# Patient Record
Sex: MALE | Race: WHITE | Employment: OTHER | URBAN - METROPOLITAN AREA
[De-identification: names, ages, dates, MRNs, and addresses within clinical notes are randomized per-mention and may not be internally consistent; named-entity substitution may affect disease eponyms.]

---

## 2017-05-24 ENCOUNTER — OFFICE VISIT (OUTPATIENT)
Dept: ORTHOPEDIC SURGERY | Age: 57
End: 2017-05-24

## 2017-05-24 VITALS
HEART RATE: 70 BPM | BODY MASS INDEX: 34.61 KG/M2 | TEMPERATURE: 98 F | WEIGHT: 221 LBS | DIASTOLIC BLOOD PRESSURE: 81 MMHG | SYSTOLIC BLOOD PRESSURE: 125 MMHG

## 2017-05-24 DIAGNOSIS — M17.11 PRIMARY OSTEOARTHRITIS OF RIGHT KNEE: Primary | ICD-10-CM

## 2017-05-24 DIAGNOSIS — M25.561 ACUTE PAIN OF RIGHT KNEE: ICD-10-CM

## 2017-05-24 RX ORDER — MELOXICAM 15 MG/1
15 TABLET ORAL
Qty: 30 TAB | Refills: 0 | Status: SHIPPED | OUTPATIENT
Start: 2017-05-24 | End: 2017-06-21 | Stop reason: SDUPTHER

## 2017-05-24 NOTE — PROCEDURES
X RAYS AT [address] [place] [address]  5/24/2017    Right KNEE:     Bones: No fractures, subluxations, or dislocations  Alignment: mild Varus Knee alignment  Joint: Medial joint with moderate OA changes    Soft Tissues: Normal   , mild swelling  Mineralization: suggests  no Osteopenia    I have personally reviewed the results of the above study. The interpretation of this study is my professional opinion.

## 2017-05-24 NOTE — MR AVS SNAPSHOT
Visit Information Date & Time Provider Department Dept. Phone Encounter #  
 5/24/2017  2:00 PM Eunice Martin MD [facility] [phone] [id number] Upcoming Health Maintenance Date Due Hepatitis C Screening 1960 DTaP/Tdap/Td series (1 - Tdap) 5/12/1983 FOBT Q 1 YEAR AGE 50-75 5/12/2012 INFLUENZA AGE 9 TO ADULT 8/1/2017 Allergies as of 5/24/2017  Review Complete On: 5/24/2017 By: Natali Villanueva No Known Allergies Current Immunizations  Never Reviewed No immunizations on file. Not reviewed this visit You Were Diagnosed With   
  
 Codes Comments Primary osteoarthritis of right knee    -  Primary ICD-10-CM: M17.11 ICD-9-CM: 715.16 Acute pain of right knee     ICD-10-CM: M25.561 ICD-9-CM: 719.46 Vitals BP Pulse Temp Weight(growth percentile) BMI Smoking Status 125/81 (BP 1 Location: Left arm, BP Patient Position: Sitting) 70 98 °F (36.7 °C) (Oral) 221 lb (100.2 kg) 34.61 kg/m2 Never Smoker BMI and BSA Data Body Mass Index Body Surface Area  
 34.61 kg/m 2 2.18 m 2 Preferred Pharmacy Pharmacy Name Phone [facility] [address], [facility] [phone] Your Updated Medication List  
  
   
This list is accurate as of: 5/24/17  3:58 PM.  Always use your most recent med list.  
  
  
  
  
 ALEVE 220 mg tablet Generic drug:  naproxen sodium Take 220 mg by mouth two (2) times daily (with meals). LIPITOR 40 mg tablet Generic drug:  atorvastatin Take  by mouth daily. meloxicam 15 mg tablet Commonly known as:  MOBIC Take 1 Tab by mouth daily (with breakfast). MOTRIN 800 mg tablet Generic drug:  ibuprofen Take  by mouth. tamsulosin 0.4 mg capsule Commonly known as:  FLOMAX Take 1 Cap by mouth daily (after dinner). Prescriptions Sent to Pharmacy Refills meloxicam (MOBIC) 15 mg tablet 0 Sig: Take 1 Tab by mouth daily (with breakfast). Class: Normal  
 Pharmacy: [facility], [address] [place] [address] #: [phone] Route: Oral  
  
We Performed the Following AMB POC XRAY, KNEE; COMPLETE, 4+ VIEW [37805 CPT(R)] To-Do List   
 05/24/2017 Imaging:  MRI KNEE RT WO CONT Referral Information Referral ID Referred By Referred To  
  
 [id number] Carlos Suzan Not Available Visits Status Start Date End Date 1 New Request 5/24/17 5/24/18 If your referral has a status of pending review or denied, additional information will be sent to support the outcome of this decision. Patient Instructions Please follow up after MRI. You are advised to contact us if your condition worsens. A MRI has been ordered for you. A [employer] will be contacting you to schedule the appointment as soon as it has been approved with your insurance company. Please schedule an appointment to follow up with the doctor or the physicians assistant after the MRI has been conducted. Knee Arthritis: Care Instructions Your Care Instructions Knee arthritis is a breakdown of the cartilage that cushions your knee joint. When the cartilage wears down, your bones rub against each other. This causes pain and stiffness. Knee arthritis tends to get worse with time. Treatment for knee arthritis involves reducing pain, making the leg muscles stronger, and staying at a healthy body weight. The treatment usually does not improve the health of the cartilage, but it can reduce pain and improve how well your knee works. You can take simple measures to protect your knee joints, ease your pain, and help you stay active. Follow-up care is a key part of your treatment and safety.  Be sure to make and go to all appointments, and call your doctor if you are having problems. It's also a good idea to know your test results and keep a list of the medicines you take. How can you care for yourself at home? · Know that knee arthritis will cause more pain on some days than on others. · Stay at a healthy weight. Lose weight if you are overweight. When you stand up, the pressure on your knees from every pound of body weight is multiplied four times. So if you lose 10 pounds, you will reduce the pressure on your knees by 40 pounds. · Talk to your doctor or physical therapist about exercises that will help ease joint pain. ¨ Stretch to help prevent stiffness and to prevent injury before you exercise. You may enjoy gentle forms of yoga to help keep your knee joints and muscles flexible. ¨ Walk instead of jog. ¨ Ride a bike. This makes your thigh muscles stronger and takes pressure off your knee. ¨ Wear well-fitting and comfortable shoes. ¨ Exercise in chest-deep water. This can help you exercise longer with less pain. ¨ Avoid exercises that include squatting or kneeling. They can put a lot of strain on your knees. ¨ Talk to your doctor to make sure that the exercise you do is not making the arthritis worse. · Do not sit for long periods of time. Try to walk once in a while to keep your knee from getting stiff. · Ask your doctor or physical therapist whether shoe inserts may reduce your arthritis pain. · If you can afford it, get new athletic shoes at least every year. This can help reduce the strain on your knees. · Use a device to help you do everyday activities. ¨ A cane or walking stick can help you keep your balance when you walk. Hold the cane or walking stick in the hand opposite the painful knee. ¨ If you feel like you may fall when you walk, try using crutches or a front-wheeled walker. These can prevent falls that could cause more damage to your knee. ¨ A knee brace may help keep your knee stable and prevent pain. ¨ You also can use other things to make life easier, such as a higher toilet seat and handrails in the bathtub or shower. · Take pain medicines exactly as directed. ¨ Do not wait until you are in severe pain. You will get better results if you take it sooner. ¨ If you are not taking a prescription pain medicine, take an over-the-counter medicine such as acetaminophen (Tylenol), ibuprofen (Advil, Motrin), or naproxen (Aleve). Read and follow all instructions on the label. ¨ Do not take two or more pain medicines at the same time unless the doctor told you to. Many pain medicines have acetaminophen, which is Tylenol. Too much acetaminophen (Tylenol) can be harmful. ¨ Tell your doctor if you take a blood thinner, have diabetes, or have allergies to shellfish. · Ask your doctor if you might benefit from a shot of steroid medicine into your knee. This may provide pain relief for several months. · Many people take the supplements glucosamine and chondroitin for osteoarthritis. Some people feel they help, but the medical research does not show that they work. Talk to your doctor before you take these supplements. When should you call for help? Call your doctor now or seek immediate medical care if: 
· You have sudden swelling, warmth, or pain in your knee. · You have knee pain and a fever or rash. · You have such bad pain that you cannot use your knee. Watch closely for changes in your health, and be sure to contact your doctor if you have any problems. Where can you learn more? Go to http://candelario-irineo.info/. Enter Y532 in the search box to learn more about \"Knee Arthritis: Care Instructions. \" Current as of: October 31, 2016 Content Version: 11.2 © 2983-6692 [employer]. Care instructions adapted under license by [employer] (which disclaims liability or warranty for this information).  If you have questions about a medical condition or this instruction, always ask your healthcare professional. [place] [address] any warranty or liability for your use of this information. Introducing [facility] SERVICES! Emilee Almazan introduces [employer] patient portal. Now you can access parts of your medical record, email your doctor's office, and request medication refills online. 1. In your internet browser, go to [email or web address] 2. Click on the First Time User? Click Here link in the Sign In box. You will see the New Member Sign Up page. 3. Enter your [employer] Access Code exactly as it appears below. You will not need to use this code after youve completed the sign-up process. If you do not sign up before the expiration date, you must request a new code. · [employer] Access Code: [email or web address] Expires: 8/22/2017  3:58 PM 
 
4. Enter the last four digits of your Social Security Number (xxxx) and Date of Birth (mm/dd/yyyy) as indicated and click Submit. You will be taken to the next sign-up page. 5. Create a [employer] ID. This will be your [employer] login ID and cannot be changed, so think of one that is secure and easy to remember. 6. Create a [employer] password. You can change your password at any time. 7. Enter your Password Reset Question and Answer. This can be used at a later time if you forget your password. 8. Enter your e-mail address. You will receive e-mail notification when new information is available in [address] 9. Click Sign Up. You can now view and download portions of your medical record. 10. Click the Download Summary menu link to download a portable copy of your medical information. If you have questions, please visit the Frequently Asked Questions section of the [employer] website. Remember, [employer] is NOT to be used for urgent needs. For medical emergencies, dial 911. Now available from your iPhone and Android! Please provide this summary of care documentation to your next provider. Your primary care clinician is listed as Malachy Lard. If you have any questions after today's visit, please call [phone].

## 2017-05-24 NOTE — PATIENT INSTRUCTIONS
Please follow up after MRI. You are advised to contact us if your condition worsens. A MRI has been ordered for you. A [employer] will be contacting you to schedule the appointment as soon as it has been approved with your insurance company. Please schedule an appointment to follow up with the doctor or the physicians assistant after the MRI has been conducted. Knee Arthritis: Care Instructions  Your Care Instructions  Knee arthritis is a breakdown of the cartilage that cushions your knee joint. When the cartilage wears down, your bones rub against each other. This causes pain and stiffness. Knee arthritis tends to get worse with time. Treatment for knee arthritis involves reducing pain, making the leg muscles stronger, and staying at a healthy body weight. The treatment usually does not improve the health of the cartilage, but it can reduce pain and improve how well your knee works. You can take simple measures to protect your knee joints, ease your pain, and help you stay active. Follow-up care is a key part of your treatment and safety. Be sure to make and go to all appointments, and call your doctor if you are having problems. It's also a good idea to know your test results and keep a list of the medicines you take. How can you care for yourself at home? · Know that knee arthritis will cause more pain on some days than on others. · Stay at a healthy weight. Lose weight if you are overweight. When you stand up, the pressure on your knees from every pound of body weight is multiplied four times. So if you lose 10 pounds, you will reduce the pressure on your knees by 40 pounds. · Talk to your doctor or physical therapist about exercises that will help ease joint pain. ¨ Stretch to help prevent stiffness and to prevent injury before you exercise. You may enjoy gentle forms of yoga to help keep your knee joints and muscles flexible. ¨ Walk instead of jog. ¨ Ride a bike.  This makes your thigh muscles stronger and takes pressure off your knee. ¨ Wear well-fitting and comfortable shoes. ¨ Exercise in chest-deep water. This can help you exercise longer with less pain. ¨ Avoid exercises that include squatting or kneeling. They can put a lot of strain on your knees. ¨ Talk to your doctor to make sure that the exercise you do is not making the arthritis worse. · Do not sit for long periods of time. Try to walk once in a while to keep your knee from getting stiff. · Ask your doctor or physical therapist whether shoe inserts may reduce your arthritis pain. · If you can afford it, get new athletic shoes at least every year. This can help reduce the strain on your knees. · Use a device to help you do everyday activities. ¨ A cane or walking stick can help you keep your balance when you walk. Hold the cane or walking stick in the hand opposite the painful knee. ¨ If you feel like you may fall when you walk, try using crutches or a front-wheeled walker. These can prevent falls that could cause more damage to your knee. ¨ A knee brace may help keep your knee stable and prevent pain. ¨ You also can use other things to make life easier, such as a higher toilet seat and handrails in the bathtub or shower. · Take pain medicines exactly as directed. ¨ Do not wait until you are in severe pain. You will get better results if you take it sooner. ¨ If you are not taking a prescription pain medicine, take an over-the-counter medicine such as acetaminophen (Tylenol), ibuprofen (Advil, Motrin), or naproxen (Aleve). Read and follow all instructions on the label. ¨ Do not take two or more pain medicines at the same time unless the doctor told you to. Many pain medicines have acetaminophen, which is Tylenol. Too much acetaminophen (Tylenol) can be harmful. ¨ Tell your doctor if you take a blood thinner, have diabetes, or have allergies to shellfish.   · Ask your doctor if you might benefit from a shot of steroid medicine into your knee. This may provide pain relief for several months. · Many people take the supplements glucosamine and chondroitin for osteoarthritis. Some people feel they help, but the medical research does not show that they work. Talk to your doctor before you take these supplements. When should you call for help? Call your doctor now or seek immediate medical care if:  · You have sudden swelling, warmth, or pain in your knee. · You have knee pain and a fever or rash. · You have such bad pain that you cannot use your knee. Watch closely for changes in your health, and be sure to contact your doctor if you have any problems. Where can you learn more? Go to http://candelario-irineo.info/. Enter D194 in the search box to learn more about \"Knee Arthritis: Care Instructions. \"  Current as of: October 31, 2016  Content Version: 11.2  © 4965-8775 [employer]. Care instructions adapted under license by [employer] (which disclaims liability or warranty for this information). If you have questions about a medical condition or this instruction, always ask your healthcare professional. [address] any warranty or liability for your use of this information.

## 2017-05-24 NOTE — PROGRESS NOTES
AMBULATORY PROGRESS NOTE      Patient: Tata Murray             MRN: [id number]     SSN: [id number] Body mass index is 34.61 kg/(m^2). YOB: 1960     AGE: 56 y.o. EX: male    PCP: Saranya Ruiz MD    IMPRESSION/DIAGNOSIS AND TREATMENT PLAN     DIAGNOSES  1. Primary osteoarthritis of right knee    2. Acute pain of right knee        Orders Placed This Encounter    [04017] Knee 4V    MRI KNEE RT WO CONT    meloxicam (MOBIC) 15 mg tablet      Tata Murray understands his diagnoses and the proposed plan. Plan:    1) MRI: Right knee  2) Mobic 15 m PO every day; 30 days, 0 refills    RTO - after MRI; at next OV cortisone injection for the right knee    HPI AND EXAMINATION     Tata Murray IS A 56 y.o. male who presents to my outpatient office complaining of: right knee pain. Patient states that his right knee pain for the past five weeks. 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 He localizes his pain on the lateral, medial, and posterior aspect of the knee. He purchased an OTC knee brace, because he feels that his right knee might go out to one side. Patient states that his mother has rheumatoid arthritis. Patient has h/o IBS. Appearance: Alert, well appearing and pleasant patient who is in no distress, oriented to person, place/time, and who follows commands. This patient is accompanied in the office by his  self. Psychiatric: Affect and mood are appropriate.    Cardiovascular/Peripheral Vascular: Normal Pulses to each hand and foot           Knees:  Right        Gait: normal         Cutaneous: Skin intact, no abrasions, blisters, wounds, erythema        Effusion: Is not present        Crepitus:  mild PF joint crepitus       Tenderness: Medial joint line and Lateral joint line    Alignment of Knee: mild varus when standing        ROM: full range with pain        Fullness or swelling: None to popliteal fossa region Stability: No instability to anterior, posterior, varus, valgus stress testing        Trust: No varus trust with gait        Contractures: No Achilles or Gastrocnemius Contractures. Calf tenderness: Absent for calf or gastrocnemius muscle regions            Soft, supple, non tender, non taut lower extremity compartments  Extremities:   No embolic phenomena to the toes          No significant edema to the foot and or toes. Edema is not present to distal 1/3 tib/fib or ankle regions. Lower extremities are warm and appear well perfused    DVT: No evidence of DVT seen on examination at this time    No calf swelling, no tenderness to calf muscles  Lymphatic:  No Evidence of Lymphedema  Vascular: Medial Border of Tibia Region: Edema is not present        Pulses: Dorsalis Pedis &  Posterior Tibial Pulses : Palpable yes        Varicosities Lower Limbs: None noted . Neuro: Negative bilateral Straight leg raise (seated position)    See Musculoskeletal section for pertinent individual extremity examination    No abnormal hand/wrist, foot/ankle, or facial/neck tremors. Extensor mechanism is intact    CHART REVIEW     Past Medical History:   Diagnosis Date    Benign enlargement of prostate     BPH (benign prostatic hypertrophy)     Chest pain     Chronic low back pain     GERD (gastroesophageal reflux disease)     Herniated nucleus pulposus     High cholesterol     Hypertension     Nocturia     Sprain and strain of lumbosacral joint/ligament     Tinea cruris     Umbilical hernia     Urinary frequency      Current Outpatient Prescriptions   Medication Sig    meloxicam (MOBIC) 15 mg tablet Take 1 Tab by mouth daily (with breakfast).  naproxen sodium (ALEVE) 220 mg tablet Take 220 mg by mouth two (2) times daily (with meals).  tamsulosin (FLOMAX) 0.4 mg capsule Take 1 Cap by mouth daily (after dinner).  ibuprofen (MOTRIN) 800 mg tablet Take  by mouth.     atorvastatin (LIPITOR) 40 mg tablet Take  by mouth daily. No current facility-administered medications for this visit. No Known Allergies  Past Surgical History:   Procedure Laterality Date    HX COLONOSCOPY       Social History     Occupational History    Not on file. Social History Main Topics    Smoking status: Never Smoker    Smokeless tobacco: Never Used    Alcohol use Yes    Drug use: No    Sexual activity: Not on file     Family History   Problem Relation Age of Onset    Hypertension Father     Stroke Mother     Cancer Mother        REVIEW OF SYSTEMS : 5/24/2017  ALL BELOW ARE Negative except : SEE HPI       Constitutional: Negative for fever, chills and weight loss. Neg Weigh Loss  Cardiovascular: Negative for chest pain, claudication and leg swelling. SOB, MARTINEZ   Gastrointestinal: Negative for  pain, N/V/D/C, Blood in stool or urine,dysuria, hematuria,        Incontinence, pelvic pain  Musculoskeletal: see HPI. Neurological: Negative for dizziness and weakness. Negative for headaches,Visual Changes, Confusion, Seizures,   Psychiatric/Behavioral: Negative for depression, memory loss and substance abuse. Extremities:  Negative for  hair changes, rash or skin lesion changes. Hematologic: Negative for Bleeding problems, bruising, pallor or swollen lymph nodes. Peripheral Vascular: No calf pain, vascular vein tenderness to calf pain              No calf throbbing, posterior knee throbbing pain    DIAGNOSTIC IMAGING     X RAYS AT [address] [place] [address]  5/24/2017    Right KNEE:     Bones: No fractures, subluxations, or dislocations  Alignment: mild Varus Knee alignment  Joint: Medial joint with moderate OA changes    Soft Tissues: Normal   , mild swelling  Mineralization: suggests  no Osteopenia    I have personally reviewed the results of the above study. The interpretation of this study is my professional opinion.     Written by Lucita Rojo, as dictated by Nancy Goldstein MD. Dr. LUIS MANUEL, Nguyen Martinez MD, confirm that all documentation is accurate.

## 2017-05-31 ENCOUNTER — OFFICE VISIT (OUTPATIENT)
Dept: FAMILY MEDICINE CLINIC | Age: 57
End: 2017-05-31

## 2017-05-31 VITALS
RESPIRATION RATE: 18 BRPM | HEIGHT: 67 IN | BODY MASS INDEX: 35.63 KG/M2 | WEIGHT: 227 LBS | OXYGEN SATURATION: 97 % | DIASTOLIC BLOOD PRESSURE: 85 MMHG | TEMPERATURE: 97.3 F | SYSTOLIC BLOOD PRESSURE: 127 MMHG | HEART RATE: 58 BPM

## 2017-05-31 DIAGNOSIS — Z00.00 PHYSICAL EXAM: Primary | ICD-10-CM

## 2017-05-31 LAB
BILIRUB UR QL STRIP: NEGATIVE
GLUCOSE UR-MCNC: NEGATIVE MG/DL
KETONES P FAST UR STRIP-MCNC: NEGATIVE MG/DL
PH UR STRIP: 5.5 [PH] (ref 4.6–8)
PROT UR QL STRIP: NEGATIVE MG/DL
SP GR UR STRIP: 1.01 (ref 1–1.03)
UA UROBILINOGEN AMB POC: NORMAL (ref 0.2–1)
URINALYSIS CLARITY POC: CLEAR
URINALYSIS COLOR POC: YELLOW
URINE BLOOD POC: NEGATIVE
URINE LEUKOCYTES POC: NEGATIVE
URINE NITRITES POC: NEGATIVE

## 2017-05-31 NOTE — PROGRESS NOTES
HISTORY OF PRESENT ILLNESS  Prashant Pettit is a 56 y.o. male. 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 Follows with Nai Land MD for PCP. He reports that he is doing well and has no complaints. Reports that he is able to complete duties needed as a . Review of Systems   Constitutional: Negative for chills, fever and malaise/fatigue. HENT: Negative for congestion, ear pain and sore throat. Eyes: Negative for double vision, photophobia and pain. Respiratory: Negative for cough, shortness of breath and wheezing. Cardiovascular: Negative for chest pain, palpitations and leg swelling. Gastrointestinal: Negative for abdominal pain, constipation, diarrhea, nausea and vomiting. Genitourinary: Negative for dysuria, frequency and urgency. Musculoskeletal: Negative for back pain, joint pain and myalgias. Skin: Negative for rash. Neurological: Negative for dizziness, weakness and headaches. Psychiatric/Behavioral: Negative for depression, substance abuse and suicidal ideas. The patient is not nervous/anxious and does not have insomnia. PHQ Screening   PHQ over the last two weeks 5/31/2017   Little interest or pleasure in doing things Not at all   Feeling down, depressed or hopeless Not at all   Total Score PHQ 2 0         History  Past Medical History:   Diagnosis Date    BPH (benign prostatic hypertrophy)     GERD (gastroesophageal reflux disease)     Herniated nucleus pulposus     High cholesterol     Hypertension        Past Surgical History:   Procedure Laterality Date    HX COLONOSCOPY         Social History     Social History    Marital status:      Spouse name: N/A    Number of children: N/A    Years of education: N/A     Occupational History    Not on file.      Social History Main Topics    Smoking status: Never Smoker    Smokeless tobacco: Never Used   [facility] Alcohol use 12.6 oz/week     7 Cans of beer, 14 Shots of liquor per week    Drug use: No    Sexual activity: Yes     Partners: Female     Birth control/ protection: None     Other Topics Concern    Not on file     Social History Narrative       No Known Allergies      Current Outpatient Prescriptions   Medication Sig Dispense Refill    meloxicam (MOBIC) 15 mg tablet Take 1 Tab by mouth daily (with breakfast). 30 Tab 0    naproxen sodium (ALEVE) 220 mg tablet Take 220 mg by mouth two (2) times daily (with meals).  tamsulosin (FLOMAX) 0.4 mg capsule Take 1 Cap by mouth daily (after dinner). 90 Cap 3    atorvastatin (LIPITOR) 40 mg tablet Take  by mouth daily.  ibuprofen (MOTRIN) 800 mg tablet Take  by mouth. Patient Care Team:  Patient Care Team:  Ivonne Monsivais MD as PCP - General (General Practice)        LABS:     Ref. Range 5/31/2017 11:12   Color (UA POC) Unknown Yellow   Clarity (UA POC) Unknown Clear   Specific gravity (UA POC) Latest Ref Range: 1.001 - 1.035  1.010   pH (UA POC) Latest Ref Range: 4.6 - 8.0  5.5   Protein (UA POC) Latest Ref Range: Negative mg/dL Negative   Glucose (UA POC) Latest Ref Range: Negative  Negative   Ketones (UA POC) Latest Ref Range: Negative  Negative   Blood (UA POC) Latest Ref Range: Negative  Negative   Bilirubin (UA POC) Latest Ref Range: Negative  Negative   Urobilinogen (UA POC) Latest Ref Range: 0.2 - 1  0.2 mg/dL   Nitrites (UA POC) Latest Ref Range: Negative  Negative   Leukocyte esterase (UA POC) Latest Ref Range: Negative  Negative       RADIOLOGY:  None new to review      Physical Exam   Constitutional: He is oriented to person, place, and time. He appears well-developed and well-nourished. No distress. HENT:   Head: Normocephalic. Right Ear: Tympanic membrane, external ear and ear canal normal.   Left Ear: Tympanic membrane, external ear and ear canal normal.   Nose: Nose normal. No mucosal edema or rhinorrhea.    Mouth/Throat: Uvula is midline, oropharynx is clear and moist and mucous membranes are normal. No oropharyngeal exudate, posterior oropharyngeal edema or posterior oropharyngeal erythema. Eyes: EOM are normal. Pupils are equal, round, and reactive to light. Neck: Normal range of motion. Neck supple. No thyromegaly present. Cardiovascular: Normal rate, regular rhythm and normal heart sounds. No murmur heard. Pulmonary/Chest: Effort normal and breath sounds normal. No respiratory distress. Abdominal: Soft. Bowel sounds are normal. There is no tenderness. Musculoskeletal: He exhibits no edema. Lymphadenopathy:     He has no cervical adenopathy. Neurological: He is alert and oriented to person, place, and time. He exhibits normal muscle tone. Coordination normal.   Skin: Skin is warm and dry. Psychiatric: He has a normal mood and affect. His speech is normal and behavior is normal. Judgment normal. His mood appears not anxious. Cognition and memory are normal. He does not exhibit a depressed mood. He expresses no homicidal and no suicidal ideation. Vitals:    05/31/17 1128   BP: 127/85   Pulse: (!) 58   Resp: 18   Temp: 97.3 °F (36.3 °C)   TempSrc: Oral   SpO2: 97%   Weight: 227 lb (103 kg)   Height: 5' 7\" (1.702 m)   PainSc:   0 - No pain       ASSESSMENT and Sandhya Chavez was seen today for employment physical.    Diagnoses and all orders for this visit:    Physical exam  *DOT physical completed today. Certificate filled out- certified for 2 years. See media tab for scanned paperwork. Advised on [facility] requirements. Recommend to continue following with PCP for preventative care. Patient needs to update medical examination should health conditions change. -     AMB POC URINALYSIS DIP STICK AUTO W/O MICRO      *Plan of care reviewed with patient. Patient in agreement with plan and expresses understanding. All questions answered and patient encouraged to call or RTO if further questions or concerns. Follow-up Disposition:  Return in about 2 years (around 5/31/2019) for DOT physical..

## 2017-06-14 ENCOUNTER — TELEPHONE (OUTPATIENT)
Dept: ORTHOPEDIC SURGERY | Age: 57
End: 2017-06-14

## 2017-06-14 NOTE — TELEPHONE ENCOUNTER
Kristi Lara from [facility] called in states pt is coming in for MRI and they need orders faxed over from Dr. Glenn Cummings.    Fax # [phone]  Tel# [phone]

## 2017-06-21 DIAGNOSIS — M17.11 PRIMARY OSTEOARTHRITIS OF RIGHT KNEE: ICD-10-CM

## 2017-06-21 DIAGNOSIS — M25.561 ACUTE PAIN OF RIGHT KNEE: ICD-10-CM

## 2017-06-21 RX ORDER — MELOXICAM 15 MG/1
15 TABLET ORAL
Qty: 30 TAB | Refills: 0 | Status: SHIPPED | OUTPATIENT
Start: 2017-06-21 | End: 2019-08-14

## 2017-06-21 NOTE — TELEPHONE ENCOUNTER
Last Visit: 05/24/2017 with MD Whyte    Next Appointment: 06/27/2017 with MD Whyte   Previous Refill Encounters: 05/24/2017 per MD Whyte #[id number]     Requested Prescriptions     Pending Prescriptions Disp Refills    meloxicam (MOBIC) 15 mg tablet 30 Tab 0     Sig: Take 1 Tab by mouth daily (with breakfast).

## 2017-06-21 NOTE — TELEPHONE ENCOUNTER
Prescription refill approved for [facility] as requested.        [address] PA-C  6/21/2017

## 2017-06-21 NOTE — TELEPHONE ENCOUNTER
Pt came by  office to req rx for mobic to be called into [facility] . Please call pt to advise

## 2017-06-22 DIAGNOSIS — M17.11 PRIMARY OSTEOARTHRITIS OF RIGHT KNEE: ICD-10-CM

## 2017-06-22 DIAGNOSIS — M25.561 ACUTE PAIN OF RIGHT KNEE: ICD-10-CM

## 2017-06-27 ENCOUNTER — OFFICE VISIT (OUTPATIENT)
Dept: ORTHOPEDIC SURGERY | Age: 57
End: 2017-06-27

## 2017-06-27 VITALS
HEIGHT: 67 IN | HEART RATE: 68 BPM | TEMPERATURE: 97.5 F | DIASTOLIC BLOOD PRESSURE: 69 MMHG | WEIGHT: 227 LBS | SYSTOLIC BLOOD PRESSURE: 135 MMHG | BODY MASS INDEX: 35.63 KG/M2

## 2017-06-27 DIAGNOSIS — M17.11 PRIMARY OSTEOARTHRITIS OF RIGHT KNEE: Primary | ICD-10-CM

## 2017-06-27 RX ORDER — LIDOCAINE HYDROCHLORIDE 10 MG/ML
3 INJECTION INFILTRATION; PERINEURAL ONCE
Qty: 0.5 ML | Refills: 0
Start: 2017-06-27 | End: 2017-06-27

## 2017-06-27 RX ORDER — TRIAMCINOLONE ACETONIDE 40 MG/ML
40 INJECTION, SUSPENSION INTRA-ARTICULAR; INTRAMUSCULAR ONCE
Qty: 1 ML | Refills: 0
Start: 2017-06-27 | End: 2017-06-27

## 2017-06-27 NOTE — PATIENT INSTRUCTIONS
The patient is instructed to follow up one month. They were advised to contact us if their condition worsens. Joint Injections: Care Instructions  Your Care Instructions  Joint injections are shots into a joint, such as the knee. They may be used to put in medicines, such as pain relievers. Or they can be used to take out fluid. Sometimes the fluid is tested in a lab. This can help find the cause of a joint problem. A corticosteroid, or steroid, shot is used to reduce inflammation in tendons or joints. It is often used to treat problems such as arthritis, tendinitis, and bursitis. Steroids can be injected directly into a painful, inflamed joint. They can also help reduce inflammation of a bursa. A bursa is a sac of fluid. It cushions and lubricates areas where tendons, ligaments, skin, muscles, or bones rub against each other. A steroid shot can sometimes help with short-term pain relief when other treatments haven't worked. If steroid shots help, pain may improve for weeks or months. Follow-up care is a key part of your treatment and safety. Be sure to make and go to all appointments, and call your doctor if you are having problems. It's also a good idea to know your test results and keep a list of the medicines you take. How can you care for yourself at home? · Put ice or a cold pack on the area for 10 to 20 minutes at a time. Put a thin cloth between the ice and your skin. · Take anti-inflammatory medicines to reduce pain, swelling, or inflammation. These include ibuprofen (Advil, Motrin) and naproxen (Aleve). Read and follow all instructions on the label. · Avoid strenuous activities for several days, especially those that put stress on the area where you got the shot. · If you have dressings over the area, keep them clean and dry. You may remove them when your doctor tells you to. When should you call for help?   Call your doctor now or seek immediate medical care if:  · You have signs of infection, such as:  ¨ Increased pain, swelling, warmth, or redness. ¨ Red streaks leading from the site. ¨ Pus draining from the site. ¨ A fever. Watch closely for changes in your health, and be sure to contact your doctor if you have any problems. Where can you learn more? Go to http://candelario-irineo.info/. Enter N616 in the search box to learn more about \"Joint Injections: Care Instructions. \"  Current as of: March 21, 2017  Content Version: 11.3  © 2598-0329 [employer]. Care instructions adapted under license by [employer] (which disclaims liability or warranty for this information). If you have questions about a medical condition or this instruction, always ask your healthcare professional. [address] any warranty or liability for your use of this information.

## 2017-06-27 NOTE — MR AVS SNAPSHOT
Visit Information Date & Time Provider Department Dept. Phone Encounter #  
 6/27/2017 10:50 AM Mayda Kyle MD [facility] [phone] [id number] Upcoming Health Maintenance Date Due Hepatitis C Screening 1960 DTaP/Tdap/Td series (1 - Tdap) 5/12/1983 FOBT Q 1 YEAR AGE 50-75 5/12/2012 INFLUENZA AGE 9 TO ADULT 8/1/2017 Allergies as of 6/27/2017  Review Complete On: 6/27/2017 By: Migue Zheng No Known Allergies Current Immunizations  Never Reviewed No immunizations on file. Not reviewed this visit You Were Diagnosed With   
  
 Codes Comments Primary osteoarthritis of right knee    -  Primary ICD-10-CM: M17.11 ICD-9-CM: 715.16 Vitals BP Pulse Temp Height(growth percentile) Weight(growth percentile) BMI  
 135/69 68 97.5 °F (36.4 °C) (Oral) 5' 7\" (1.702 m) 227 lb (103 kg) 35.55 kg/m2 Smoking Status Never Smoker Vitals History BMI and BSA Data Body Mass Index Body Surface Area 35.55 kg/m 2 2.21 m 2 Preferred Pharmacy Pharmacy Name Phone [place] [facility], [address] [place] [address] Road [phone] Your Updated Medication List  
  
   
This list is accurate as of: 6/27/17 11:07 AM.  Always use your most recent med list.  
  
  
  
  
 ALEVE 220 mg tablet Generic drug:  naproxen sodium Take 220 mg by mouth two (2) times daily (with meals). LIPITOR 40 mg tablet Generic drug:  atorvastatin Take  by mouth daily. meloxicam 15 mg tablet Commonly known as:  MOBIC Take 1 Tab by mouth daily (with breakfast). MOTRIN 800 mg tablet Generic drug:  ibuprofen Take  by mouth. tamsulosin 0.4 mg capsule Commonly known as:  FLOMAX Take 1 Cap by mouth daily (after dinner). Patient Instructions The patient is instructed to follow up one month.  They were advised to contact us if their condition worsens. Joint Injections: Care Instructions Your Care Instructions Joint injections are shots into a joint, such as the knee. They may be used to put in medicines, such as pain relievers. Or they can be used to take out fluid. Sometimes the fluid is tested in a lab. This can help find the cause of a joint problem. A corticosteroid, or steroid, shot is used to reduce inflammation in tendons or joints. It is often used to treat problems such as arthritis, tendinitis, and bursitis. Steroids can be injected directly into a painful, inflamed joint. They can also help reduce inflammation of a bursa. A bursa is a sac of fluid. It cushions and lubricates areas where tendons, ligaments, skin, muscles, or bones rub against each other. A steroid shot can sometimes help with short-term pain relief when other treatments haven't worked. If steroid shots help, pain may improve for weeks or months. Follow-up care is a key part of your treatment and safety. Be sure to make and go to all appointments, and call your doctor if you are having problems. It's also a good idea to know your test results and keep a list of the medicines you take. How can you care for yourself at home? · Put ice or a cold pack on the area for 10 to 20 minutes at a time. Put a thin cloth between the ice and your skin. · Take anti-inflammatory medicines to reduce pain, swelling, or inflammation. These include ibuprofen (Advil, Motrin) and naproxen (Aleve). Read and follow all instructions on the label. · Avoid strenuous activities for several days, especially those that put stress on the area where you got the shot. · If you have dressings over the area, keep them clean and dry. You may remove them when your doctor tells you to. When should you call for help? Call your doctor now or seek immediate medical care if: 
· You have signs of infection, such as: 
¨ Increased pain, swelling, warmth, or redness. ¨ Red streaks leading from the site. ¨ Pus draining from the site. ¨ A fever. Watch closely for changes in your health, and be sure to contact your doctor if you have any problems. Where can you learn more? Go to http://candelario-irineo.info/. Enter N616 in the search box to learn more about \"Joint Injections: Care Instructions. \" Current as of: March 21, 2017 Content Version: 11.3 © 8728-0757 [employer]. Care instructions adapted under license by [employer] (which disclaims liability or warranty for this information). If you have questions about a medical condition or this instruction, always ask your healthcare professional. [address] any warranty or liability for your use of this information. Introducing [facility]! Dear Emigdio Cabrera: Thank you for requesting a [employer] account. Our records indicate that you already have an active [employer] account. You can access your account anytime at [email or web address] Did you know that you can access your hospital and ER discharge instructions at any time in [employer]? You can also review all of your test results from your hospital stay or ER visit. Additional Information If you have questions, please visit the Frequently Asked Questions section of the [employer] website at [email or web address] Remember, [employer] is NOT to be used for urgent needs. For medical emergencies, dial 911. Now available from your iPhone and Android! Please provide this summary of care documentation to your next provider. Your primary care clinician is listed as Natalya Berger. If you have any questions after today's visit, please call [phone].

## 2017-06-27 NOTE — PROGRESS NOTES
AMBULATORY PROGRESS NOTE      Patient: Tasha Chung             MRN: [id number]     SSN: [id number] Body mass index is 35.55 kg/(m^2). YOB: 1960     AGE: 56 y.o. EX: male    PCP: Remi Winston MD    IMPRESSION/DIAGNOSIS AND TREATMENT PLAN     DIAGNOSES  1. Primary osteoarthritis of right knee        Orders Placed This Encounter    DRAIN/INJECT LARGE JOINT/BURSA    TRIAMCINOLONE ACETONIDE INJ    triamcinolone acetonide (KENALOG) 40 mg/mL injection    lidocaine (XYLOCAINE) 10 mg/mL (1 %) injection      Tasha Chung understands his diagnoses and the proposed plan. In this individual who is having pain and discomfort in the right knee and who has osteoarthritis of the right knee, recommendations are for a cortisone injection to the right knee. He had an MRI of the right knee done at [facility], and these studies revealed primarily osteoarthritic changes, moderate to severe, to the medial compartment of the right knee. The results are listed in the diagnostic or imaging section of [facility]. The ACL, PCL, MCL, and LCL, each of these four ligaments are intact. There is moderate to high-grade segment articular cartilage loss involving the weightbearing portion of the medial compartment. There is subchondral edema involving the anterior margin of the medial tibial plateau. There is thinning of the articular cartilage laterally, no full-thickness loss in this lateral compartment. There is severe, medial, patellar facet osteoarthritic changes and thinning of the cartilage across this region. No loose bodies. There is trace effusion to this knee. Plan:    1) Cortisone injection in the right knee with 1 mL Kenalog and 3 mL Xylocaine  2) Instructed him to consider wearing knee pads when doing stan    RTO - 5 weeks    HPI AND EXAMINATION     Tasha Chung IS A 56 y.o. male who presents to my outpatient office for follow up of right knee pain. His right knee pain has been present since May 2017. At last office visit, I ordered an MRI of the right knee and patient was provided with prescription for Mobic. Patient presents to the office to review his MRI results. He rates his pain 6/10 today. He is requesting a cortisone injection. 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 Patient states that his mother has rheumatoid arthritis. Patient has h/o IBS.    Appearance: Alert, well appearing and pleasant patient who is in no distress, oriented to person, place/time, and who follows commands. This patient is accompanied in the office by his  self. Psychiatric: Affect and mood are appropriate. Cardiovascular/Peripheral Vascular: Normal Pulses to each hand and foot      Knees:  Right                         Gait: normal                          Cutaneous: Skin intact, no abrasions, blisters, wounds, erythema                        Effusion: Is not present                        Crepitus:  mild PF joint crepitus                        Tenderness: Medial joint line and Lateral joint line                         Alignment of Knee: mild varus when standing                        ROM: full range with pain                        Fullness or swelling: None to popliteal fossa region                        Stability: No instability to anterior, posterior, varus, valgus stress testing                        Trust: No varus trust with gait                        Contractures: No Achilles or Gastrocnemius Contractures. Calf tenderness: Absent for calf or gastrocnemius muscle regions                       Soft, supple, non tender, non taut lower extremity compartments  Extremities:   No embolic phenomena to the toes                           No significant edema to the foot and or toes. Edema is not present to distal 1/3 tib/fib or ankle regions.                           Lower extremities are warm and appear well perfused                          DVT: No evidence of DVT seen on examination at this time                          No calf swelling, no tenderness to calf muscles  Lymphatic:  No Evidence of Lymphedema  Vascular: Medial Border of Tibia Region: Edema is not present                        Pulses: Dorsalis Pedis &  Posterior Tibial Pulses : Palpable yes                         Varicosities Lower Limbs: None noted . Neuro: Negative bilateral Straight leg raise (seated position)                      See Musculoskeletal section for pertinent individual extremity examination                       No abnormal hand/wrist, foot/ankle, or facial/neck tremors. Extensor mechanism is intact     CHART REVIEW     Past Medical History:   Diagnosis Date    BPH (benign prostatic hypertrophy)     GERD (gastroesophageal reflux disease)     Herniated nucleus pulposus     High cholesterol     Hypertension      Current Outpatient Prescriptions   Medication Sig    triamcinolone acetonide (KENALOG) 40 mg/mL injection 1 mL by IntraMUSCular route once for 1 dose.  lidocaine (XYLOCAINE) 10 mg/mL (1 %) injection 3 mL by Intra artICUlar route once for 1 dose.  meloxicam (MOBIC) 15 mg tablet Take 1 Tab by mouth daily (with breakfast).  tamsulosin (FLOMAX) 0.4 mg capsule Take 1 Cap by mouth daily (after dinner).  ibuprofen (MOTRIN) 800 mg tablet Take  by mouth.  naproxen sodium (ALEVE) 220 mg tablet Take 220 mg by mouth two (2) times daily (with meals).  atorvastatin (LIPITOR) 40 mg tablet Take  by mouth daily. No current facility-administered medications for this visit. No Known Allergies  Past Surgical History:   Procedure Laterality Date    HX COLONOSCOPY       Social History     Occupational History    Not on file.      Social History Main Topics    Smoking status: Never Smoker    Smokeless tobacco: Never Used    Alcohol use 12.6 oz/week     7 Cans of beer, 14 Shots of liquor per week    Drug use: No    Sexual activity: Yes     Partners: Female     Birth control/ protection: None     Family History   Problem Relation Age of Onset    Hypertension Father     Stroke Mother     Cancer Mother        REVIEW OF SYSTEMS : 6/27/2017  ALL BELOW ARE Negative except : SEE HPI       Constitutional: Negative for fever, chills and weight loss. Neg Weigh Loss  Cardiovascular: Negative for chest pain, claudication and leg swelling. SOB, MARTINEZ   Gastrointestinal: Negative for  pain, N/V/D/C, Blood in stool or urine,dysuria, hematuria,        Incontinence, pelvic pain  Musculoskeletal: see HPI. Neurological: Negative for dizziness and weakness. Negative for headaches,Visual Changes, Confusion, Seizures,   Psychiatric/Behavioral: Negative for depression, memory loss and substance abuse. Extremities:  Negative for  hair changes, rash or skin lesion changes. Hematologic: Negative for Bleeding problems, bruising, pallor or swollen lymph nodes. Peripheral Vascular: No calf pain, vascular vein tenderness to calf pain              No calf throbbing, posterior knee throbbing pain    DIAGNOSTIC IMAGING     PROCEDURE       ODETTE PROCEDURE OUTPATIENT PROCEDURE    PROCEDURE:  Injection of the right  INTRAARTICULAR KNEE INJECTION       I, Linda Toure MD, have reviewed the History, Physical and updated the Allergic reactions for [address] [place] [address] performed immediately prior to start of procedure:       * Patient was identified by name Viviana Sloan and date of birth (1960)  * Agreement on procedure being performed was verified  * Risks and Benefits explained to the patient: see below  * Procedure site verified and marked as necessary  * Patient was positioned for comfort  * Verbal Consent and Written Consent Verified by myself and my office staff.   * Complications: None  *  All patient and/or family questions answered     The procedure was explained to the patient and possible adverse reactions were discussed. These risks included, but not limited to: bleeding, infection, septic arthritis, local skin irritation (skin discoloration, hyperpigmentation, hypopigmentation, thinning of the skin, development of a wound, skin necrosis), synovitis, subcutaneous fat pad atrophy, subcutaneous abscess, tendon rupture, transient hyperglycemia. Infection may occur requiring surgical debridement and hospitalization. All Diabetics instructed to check serum blood sugar levels 3 times a day (day of the injection) due to hyperglycemia induced from cortisone injections. If serum blood sugar level > 250 mg%, Shay Larry instructed to call PCP to determine if any additional measures are needed. Time: 11:34 AM  Date of procedure: 6/27/2017  Procedure performed by: Noreen Crowe MD  Provider assisted by:  MA  Patient accompanied by: self  How tolerated by patient: tolerated the procedure well with no complications  Comments: none    The   Right INTRAARTICULAR KNEE  area was prepped and cleansed with: sterile fashion using a following solution:  Betadine. The injection was administered:  A solution of 40 mg of Kenalog and 3 ml of 1% plain lidocaine% plain was used. The pain assessment score PRIOR/AFTER to the injection: 4 /10 // 4 /10 pain severity, mild intensity, aching Pain quality    Post injection instructions were given to Martín Connelly: Remove the band aid in 3 hours, Wash site with clean soap, No further dressings there after. Call Noreen Crowe  [phone] if any: pain, redness, drainage, fever, or any concerns/questions that Martín Connelly may have regarding the injection. Martín Connelly was advised on the signs of infection and instructed to go to the ER if it is office after hours. Shilpa Paloma Larry tolerated the injection quite well.     Noreen Crowe MD MD  11:34 AM         Written by Adwoa Vasquez, as dictated by Elise Ospina MD. Dr. LUIS MANUEL, Robert Banks MD, confirm that all documentation is accurate.

## 2017-06-27 NOTE — PROCEDURES
PROCEDURE       ODETTE PROCEDURE OUTPATIENT PROCEDURE    PROCEDURE:  Injection of the right  INTRAARTICULAR KNEE INJECTION       I, Felicita Sneed MD, have reviewed the History, Physical and updated the Allergic reactions for [facility] performed immediately prior to start of procedure:       * Patient was identified by name Briana Mendes and date of birth (1960)  * Agreement on procedure being performed was verified  * Risks and Benefits explained to the patient: see below  * Procedure site verified and marked as necessary  * Patient was positioned for comfort  * Verbal Consent and Written Consent Verified by myself and my office staff. * Complications: None  *  All patient and/or family questions answered     The procedure was explained to the patient and possible adverse reactions were discussed. These risks included, but not limited to: bleeding, infection, septic arthritis, local skin irritation (skin discoloration, hyperpigmentation, hypopigmentation, thinning of the skin, development of a wound, skin necrosis), synovitis, subcutaneous fat pad atrophy, subcutaneous abscess, tendon rupture, transient hyperglycemia. Infection may occur requiring surgical debridement and hospitalization. All Diabetics instructed to check serum blood sugar levels 3 times a day (day of the injection) due to hyperglycemia induced from cortisone injections. If serum blood sugar level > 250 mg%, Shay Larry instructed to call PCP to determine if any additional measures are needed. Time: 11:34 AM  Date of procedure: 6/27/2017  Procedure performed by: Felicita Sneed MD  Provider assisted by:  MA  Patient accompanied by: self  How tolerated by patient: tolerated the procedure well with no complications  Comments: none    The   Right INTRAARTICULAR KNEE  area was prepped and cleansed with: sterile fashion using a following solution:  Betadine.  The injection was administered:  A solution of 40 mg of Kenalog and 3 ml of 1% plain lidocaine% plain was used. The pain assessment score PRIOR/AFTER to the injection: 4 /10 // 4 /10 pain severity, mild intensity, aching Pain quality    Post injection instructions were given to Ankur Lemos: Remove the band aid in 3 hours, Wash site with clean soap, No further dressings there after. Call Hermelindo Melgar  [phone] if any: pain, redness, drainage, fever, or any concerns/questions that Ankur Lemos may have regarding the injection. Ankur Lemos was advised on the signs of infection and instructed to go to the ER if it is office after hours. Jessie Larry tolerated the injection quite well.     Hermelindo Melgar MD MD  11:34 AM

## 2018-10-22 ENCOUNTER — OFFICE VISIT (OUTPATIENT)
Dept: ORTHOPEDIC SURGERY | Age: 58
End: 2018-10-22

## 2018-10-22 VITALS
HEIGHT: 67 IN | WEIGHT: 230.8 LBS | OXYGEN SATURATION: 98 % | TEMPERATURE: 97 F | BODY MASS INDEX: 36.22 KG/M2 | HEART RATE: 59 BPM | RESPIRATION RATE: 16 BRPM | SYSTOLIC BLOOD PRESSURE: 118 MMHG | DIASTOLIC BLOOD PRESSURE: 84 MMHG

## 2018-10-22 DIAGNOSIS — M25.561 CHRONIC PAIN OF RIGHT KNEE: ICD-10-CM

## 2018-10-22 DIAGNOSIS — G89.29 CHRONIC PAIN OF RIGHT KNEE: ICD-10-CM

## 2018-10-22 DIAGNOSIS — M17.11 PRIMARY OSTEOARTHRITIS OF RIGHT KNEE: Primary | ICD-10-CM

## 2018-10-22 PROBLEM — E66.01 SEVERE OBESITY (HCC): Status: ACTIVE | Noted: 2018-10-22

## 2018-10-22 RX ORDER — TRIAMCINOLONE ACETONIDE 40 MG/ML
40 INJECTION, SUSPENSION INTRA-ARTICULAR; INTRAMUSCULAR ONCE
Qty: 1 ML | Refills: 0
Start: 2018-10-22 | End: 2018-10-22

## 2018-10-22 NOTE — PATIENT INSTRUCTIONS
Please follow up in 4 weeks. You are advised to contact us if your condition worsens. Knee Arthritis: Care Instructions  Your Care Instructions    Knee arthritis is a breakdown of the cartilage that cushions your knee joint. When the cartilage wears down, your bones rub against each other. This causes pain and stiffness. Knee arthritis tends to get worse with time. Treatment for knee arthritis involves reducing pain, making the leg muscles stronger, and staying at a healthy body weight. The treatment usually does not improve the health of the cartilage, but it can reduce pain and improve how well your knee works. You can take simple measures to protect your knee joints, ease your pain, and help you stay active. Follow-up care is a key part of your treatment and safety. Be sure to make and go to all appointments, and call your doctor if you are having problems. It's also a good idea to know your test results and keep a list of the medicines you take. How can you care for yourself at home? · Know that knee arthritis will cause more pain on some days than on others. · Stay at a healthy weight. Lose weight if you are overweight. When you stand up, the pressure on your knees from every pound of body weight is multiplied four times. So if you lose 10 pounds, you will reduce the pressure on your knees by 40 pounds. · Talk to your doctor or physical therapist about exercises that will help ease joint pain. ? Stretch to help prevent stiffness and to prevent injury before you exercise. You may enjoy gentle forms of yoga to help keep your knee joints and muscles flexible. ? Walk instead of jog.  ? Ride a bike. This makes your thigh muscles stronger and takes pressure off your knee. ? Wear well-fitting and comfortable shoes. ? Exercise in chest-deep water. This can help you exercise longer with less pain. ? Avoid exercises that include squatting or kneeling. They can put a lot of strain on your knees.   ? Talk to your doctor to make sure that the exercise you do is not making the arthritis worse. · Do not sit for long periods of time. Try to walk once in a while to keep your knee from getting stiff. · Ask your doctor or physical therapist whether shoe inserts may reduce your arthritis pain. · If you can afford it, get new athletic shoes at least every year. This can help reduce the strain on your knees. · Use a device to help you do everyday activities. ? A cane or walking stick can help you keep your balance when you walk. Hold the cane or walking stick in the hand opposite the painful knee. ? If you feel like you may fall when you walk, try using crutches or a front-wheeled walker. These can prevent falls that could cause more damage to your knee. ? A knee brace may help keep your knee stable and prevent pain. ? You also can use other things to make life easier, such as a higher toilet seat and handrails in the bathtub or shower. · Take pain medicines exactly as directed. ? Do not wait until you are in severe pain. You will get better results if you take it sooner. ? If you are not taking a prescription pain medicine, take an over-the-counter medicine such as acetaminophen (Tylenol), ibuprofen (Advil, Motrin), or naproxen (Aleve). Read and follow all instructions on the label. ? Do not take two or more pain medicines at the same time unless the doctor told you to. Many pain medicines have acetaminophen, which is Tylenol. Too much acetaminophen (Tylenol) can be harmful. ? Tell your doctor if you take a blood thinner, have diabetes, or have allergies to shellfish. · Ask your doctor if you might benefit from a shot of steroid medicine into your knee. This may provide pain relief for several months. · Many people take the supplements glucosamine and chondroitin for osteoarthritis. Some people feel they help, but the medical research does not show that they work.  Talk to your doctor before you take these supplements. When should you call for help? Call your doctor now or seek immediate medical care if:    · You have sudden swelling, warmth, or pain in your knee.     · You have knee pain and a fever or rash.     · You have such bad pain that you cannot use your knee.    Watch closely for changes in your health, and be sure to contact your doctor if you have any problems. Where can you learn more? Go to http://candelario-irineo.info/. Enter A263 in the search box to learn more about \"Knee Arthritis: Care Instructions. \"  Current as of: June 11, 2018  Content Version: 11.8  © 8525-9329 [employer]. Care instructions adapted under license by [employer] (which disclaims liability or warranty for this information). If you have questions about a medical condition or this instruction, always ask your healthcare professional. [address] any warranty or liability for your use of this information. Osteoarthritis: Care Instructions  Your Care Instructions    Arthritis is a common health problem in which the joints are inflamed. There are several kinds of arthritis. Osteoarthritis is caused by a breakdown of cartilage, the hard, thick tissue that cushions the joints. It causes pain, stiffness, and swelling, often in the spine, fingers, hips, and knees. Osteoarthritis can happen at any age, but it is most common in older people. Osteoarthritis never goes away completely, but it can be controlled. Medicine and home treatment can reduce the pain and prevent the arthritis from getting worse. Follow-up care is a key part of your treatment and safety. Be sure to make and go to all appointments, and call your doctor if you are having problems. It's also a good idea to know your test results and keep a list of the medicines you take. How can you care for yourself at home? · Take a warm shower or bath in the morning to relieve stiffness.  Avoid sitting still afterwards. · If the joint is not swollen, use moist heat, like a warm, damp towel, for 20 to 30 minutes, 2 or 3 times a day. Do not use heat on a swollen joint. · If the joint is swollen, use ice or cold packs for 10 to 20 minutes, once an hour. Cold will help relieve pain and reduce inflammation. Put a thin cloth between the ice and your skin. · To prevent stiffness, gently move the joint through its full range of motion several times a day. · If the joint hurts, avoid activities that put a strain on it for a few days. Take rest breaks throughout the day. · Get regular exercise. Walking, swimming, yoga, biking, jaison chi, and water aerobics are good exercises that are gentle on the joints. · Reach and stay at a healthy weight. If you need to lose or maintain weight, regular exercise and a healthy diet will help. Extra weight can strain the joints, especially the knees and hips, and make the pain worse. Losing even a few pounds may help. · Take pain medicines exactly as directed. ? If the doctor gave you a prescription medicine for pain, take it as prescribed. ? If you are not taking a prescription pain medicine, ask your doctor if you can take an over-the-counter medicine. When should you call for help? Call your doctor now or seek immediate medical care if:    · The pain is so bad that you cannot use the joint.     · You have sudden back pain with weakness in your legs or loss of bowel or bladder control.     · Your stools are black and tarlike or have streaks of blood.     · You have severe pain and swelling in more than one joint.    Watch closely for changes in your health, and be sure to contact your doctor if:    · You have side effects from the medicines, like belly pain, ongoing heartburn, or nausea.     · Joint pain continues for more than 6 weeks, and home treatment is not helping. Where can you learn more? Go to http://candelario-irineo.info/.   Enter D836 in the search box to learn more about \"Osteoarthritis: Care Instructions. \"  Current as of: June 11, 2018  Content Version: 11.8  © 4173-5293 [employer]. Care instructions adapted under license by [employer] (which disclaims liability or warranty for this information). If you have questions about a medical condition or this instruction, always ask your healthcare professional. [address] any warranty or liability for your use of this information. Joint Injections: Care Instructions  Your Care Instructions    Joint injections are shots into a joint, such as the knee. They may be used to put in medicines, such as pain relievers. A corticosteroid, or steroid, shot is used to reduce inflammation in tendons or joints. It is often used to treat problems such as arthritis, tendinitis, and bursitis. Steroids can be injected directly into a painful, inflamed joint. They can also help reduce inflammation of a bursa. A bursa is a sac of fluid. It cushions and lubricates areas where tendons, ligaments, skin, muscles, or bones rub against each other. A steroid shot can sometimes help with short-term pain relief when other treatments haven't worked. If steroid shots help, pain may improve for weeks or months. Follow-up care is a key part of your treatment and safety. Be sure to make and go to all appointments, and call your doctor if you are having problems. It's also a good idea to know your test results and keep a list of the medicines you take. How can you care for yourself at home? · Put ice or a cold pack on the area for 10 to 20 minutes at a time. Put a thin cloth between the ice and your skin. · Ask your doctor if you can take an over-the-counter pain medicine, such as acetaminophen (Tylenol), ibuprofen (Advil, Motrin), or naproxen (Aleve). Be safe with medicines. Read and follow all instructions on the label. · Avoid strenuous activities for several days.  In particular, avoid ones that put stress on the area where you got the shot. · If you have dressings over the area, keep them clean and dry. You may remove them when your doctor tells you to. When should you call for help? Call your doctor now or seek immediate medical care if:    · You have signs of infection, such as:  ? Increased pain, swelling, warmth, or redness. ? Red streaks leading from the site. ? Pus draining from the site. ? A fever.    Watch closely for changes in your health, and be sure to contact your doctor if you have any problems. Where can you learn more? Go to http://candelario-irineo.info/. Enter N616 in the search box to learn more about \"Joint Injections: Care Instructions. \"  Current as of: November 29, 2017  Content Version: 11.8  © 4596-5300 [employer]. Care instructions adapted under license by [employer] (which disclaims liability or warranty for this information). If you have questions about a medical condition or this instruction, always ask your healthcare professional. [place] [address] any warranty or liability for your use of this information.

## 2018-10-22 NOTE — PROGRESS NOTES
AMBULATORY PROGRESS NOTE      Patient: Maureen Benson             MRN: [id number]     SSN: [id number] Body mass index is 36.15 kg/m². YOB: 1960     AGE: 66 y.o. EX: male    PCP: Arnulfo Ormond., MD     IMPRESSION/DIAGNOSIS AND TREATMENT PLAN     DIAGNOSES  1. Primary osteoarthritis of right knee    2. Chronic pain of right knee        Orders Placed This Encounter    DRAIN/INJECT LARGE JOINT/BURSA    [08062] Knee 4V    TRIAMCINOLONE ACETONIDE INJ    triamcinolone acetonide (KENALOG) 40 mg/mL injection      Maureen Benson understands his diagnoses and the proposed plan. Plan:    1) Cortisone injection to the right knee: 1 mL Kenalog and 3 mL Lidocaine. 2) Use cryotherapy as directed. 3) Anticipate MRI of the right knee if condition does not improve. RTO - 4 weeks     HPI AND EXAMINATION     Nabil Larry IS A 66 y.o. male who presents to my outpatient office for follow up of primary osteoarthritis of the right knee. At last visit, I provided a Cortisone injection to the right knee and instructed the patient to wear knee pads when doing stan. Since we saw him last, Mr. Ankush Esparza reports that for the past few months, he has been experiencing major problems with his right knee. He states that he has been unable to go down stairs straight, and that he has to go down \"sideways\". He notes that some days, he can hardly stand on his RLE. He reports that he has been experiencing popping and grinding in his right knee. He reports that he experiences a throbbing pain, as well, that wakes him up in the middle of the night. The patient states that he has been taking OTC Ibuprofen for his pain and that he has taken Mobic in the past for his pain, which he recalls helped. Mr. Ankuhs Esparza notes that his previous Cortisone injection helped greatly with his pain for a few months. XR imaging has been reviewed with the patient. The patient last had an MRI of his right knee in July 2017. Appearance: Alert, well appearing and pleasant patient who is in no distress, oriented to person, place/time, and who follows commands. This patient is accompanied in the office by his  self. Psychiatric: Affect and mood are appropriate. Cardiovascular/Peripheral Vascular: Normal Pulses to each hand and foot      Knees:  Right                         Gait: normal                          Cutaneous: Skin intact, no abrasions, blisters, wounds, erythema                        Effusion: Is not present                        Crepitus:  mild PF joint crepitus                        Tenderness: Medial joint line and Lateral joint line                         Alignment of Knee: mild varus when standing                        ROM: full range with pain                        Fullness or swelling: None to popliteal fossa region                        Stability: No instability to anterior, posterior, varus, valgus stress testing                        Trust: No varus trust with gait                        Contractures: No Achilles or Gastrocnemius Contractures.                         Calf tenderness: Absent for calf or gastrocnemius muscle regions                       Soft, supple, non tender, non taut lower extremity compartments  Extremities:   No embolic phenomena to the toes                           No significant edema to the foot and or toes.                         LQAVR is not present to distal 1/3 tib/fib or ankle regions.                         Lower extremities are warm and appear well perfused                          DVT: No evidence of DVT seen on examination at this time                          No calf swelling, no tenderness to calf muscles  Lymphatic:  No Evidence of Lymphedema  Vascular: Medial Border of Tibia Region: Edema is not present                        Pulses: Dorsalis Pedis &  Posterior Tibial Pulses : Palpable yes                         Varicosities Lower Limbs: None noted .   Neuro: Negative bilateral Straight leg raise (seated position)                      See Musculoskeletal section for pertinent individual extremity examination                       No abnormal hand/wrist, foot/ankle, or facial/neck tremors.                       Extensor mechanism is intact    CHART REVIEW     Past Medical History:   Diagnosis Date    BPH (benign prostatic hypertrophy)     GERD (gastroesophageal reflux disease)     Herniated nucleus pulposus     High cholesterol     Hypertension      Current Outpatient Medications   Medication Sig    triamcinolone acetonide (KENALOG) 40 mg/mL injection 1 mL by IntraMUSCular route once for 1 dose.  meloxicam (MOBIC) 15 mg tablet Take 1 Tab by mouth daily (with breakfast).  naproxen sodium (ALEVE) 220 mg tablet Take 220 mg by mouth two (2) times daily (with meals).  tamsulosin (FLOMAX) 0.4 mg capsule Take 1 Cap by mouth daily (after dinner).  ibuprofen (MOTRIN) 800 mg tablet Take  by mouth.  atorvastatin (LIPITOR) 40 mg tablet Take  by mouth daily. No current facility-administered medications for this visit. No Known Allergies  Past Surgical History:   Procedure Laterality Date    HX COLONOSCOPY       Social History     Occupational History    Not on file   Tobacco Use    Smoking status: Never Smoker    Smokeless tobacco: Never Used   Substance and Sexual Activity    Alcohol use: Yes     Alcohol/week: 12.6 oz     Types: 7 Cans of beer, 14 Shots of liquor per week    Drug use: No    Sexual activity: Yes     Partners: Female     Birth control/protection: None     Family History   Problem Relation Age of Onset    Hypertension Father     Stroke Mother     Cancer Mother         REVIEW OF SYSTEMS : 10/22/2018  ALL BELOW ARE Negative except : SEE HPI     Constitutional: Negative for fever, chills and weight loss. Neg Weight Loss  Cardiovascular: Negative for chest pain, claudication and leg swelling.  SOB, MARTINEZ   Gastrointestinal/Urological: Negative for  pain, N/V/D/C, Blood in stool or urine,dysuria                         Hematuria, Incontinence, pelvic pain  Musculoskeletal: see HPI. Neurological: Negative for dizziness and weakness, headaches,Visual Changes             Confusion,  Or Seizures,   Psychiatric/Behavioral: Negative for depression, memory loss and substance abuse. Extremities:  Negative for hair changes, rash or skin lesion changes. Hematologic: Negative for Bleeding problems, bruising, pallor or swollen lymph nodes. Peripheral Vascular: No calf pain, vascular vein tenderness to calf pain              No calf throbbing, posterior knee throbbing pain     DIAGNOSTIC IMAGING       X RAYS AT [address] [place] [address]  10/22/2018    Right KNEE:     NON WEIGHT BEARING    Bones: No fractures, subluxations, or dislocations  Alignment: mild Varus Knee alignment  Joint: Medial joint with mild OA changes    Soft Tissues: Mild swelling  Mineralization:suggests Normal Bone    I have personally reviewed the results of the above study. The interpretation of this study is my professional opinion. Please see above section of this report. I have personally reviewed the results of the above study. The interpretation of this study is my professional opinion. Written by Rakel Molina, as dictated by Dr. Komal Sevilla. I, Dr. Komal Sevilla, confirm that all documentation is accurate.

## 2018-11-12 ENCOUNTER — OFFICE VISIT (OUTPATIENT)
Dept: ORTHOPEDIC SURGERY | Age: 58
End: 2018-11-12

## 2018-11-12 VITALS
HEART RATE: 66 BPM | WEIGHT: 231 LBS | RESPIRATION RATE: 16 BRPM | TEMPERATURE: 97.6 F | HEIGHT: 67 IN | BODY MASS INDEX: 36.26 KG/M2 | SYSTOLIC BLOOD PRESSURE: 132 MMHG | OXYGEN SATURATION: 98 % | DIASTOLIC BLOOD PRESSURE: 78 MMHG

## 2018-11-12 DIAGNOSIS — G89.29 CHRONIC PAIN OF RIGHT KNEE: ICD-10-CM

## 2018-11-12 DIAGNOSIS — M25.561 CHRONIC PAIN OF RIGHT KNEE: ICD-10-CM

## 2018-11-12 DIAGNOSIS — M22.2X1 PATELLOFEMORAL PAIN SYNDROME OF RIGHT KNEE: ICD-10-CM

## 2018-11-12 DIAGNOSIS — M17.11 PRIMARY OSTEOARTHRITIS OF RIGHT KNEE: Primary | ICD-10-CM

## 2018-11-12 NOTE — PROGRESS NOTES
AMBULATORY PROGRESS NOTE Patient: Jaki Auguste             MRN: [id number]     SSN: [id number] Body mass index is 36.18 kg/m². YOB: 1960     AGE: 66 y.o. EX: male PCP: Bharat Garcia MD 
 
 IMPRESSION/DIAGNOSIS AND TREATMENT PLAN  
 
DIAGNOSES No diagnosis found. No orders of the defined types were placed in this encounter. Jaki Auguste understands his diagnoses and the proposed plan. Plan: 
 
1) 
2) RTO - 
 HPI AND EXAMINATION Jaki Auguste IS A 66 y.o. male who presents to my outpatient office for follow up of primary osteoarthritis of the right knee. At the last visit, I provided a Cortisone injection to the right knee and instructed the patient to use cryotherapy as directed. Since we saw him last, *** Visit Vitals /78 Pulse 66 Temp 97.6 °F (36.4 °C) (Oral) Resp 16 Ht 5' 7\" (1.702 m) Wt 231 lb (104.8 kg) SpO2 98% BMI 36.18 kg/m² Appearance: Alert, well appearing and pleasant patient who is in no distress, oriented to person, place/time, and who follows commands. This patient is accompanied in the office by his  self. Psychiatric: Affect and mood are appropriate. Cardiovascular/Peripheral Vascular: Normal Pulses to each hand and foot 
   
Knees:  Right  
                      Gait: normal   
                      Cutaneous: Skin intact, no abrasions, blisters, wounds, erythema 
                      Effusion: Is not present 
                      Crepitus:  mild PF joint crepitus                       Tenderness: Medial joint line and Lateral joint line  
                      Alignment of Knee: mild varus when standing 
                      ROM: full range with pain 
                      Fullness or swelling: None to popliteal fossa region                       Stability: No instability to anterior, posterior, varus, valgus stress testing 
                      Trust: No varus trust with gait                       Contractures: No Achilles or Gastrocnemius Contractures.  
                      Calf tenderness: Absent for calf or gastrocnemius muscle regions                      UMSZ, supple, non tender, non taut lower extremity compartments Extremities:   No embolic phenomena to the toes                         SJ significant edema to the foot and or toes.                         DSFZP is not present to distal 1/3 tib/fib or ankle regions.                         Lower extremities are warm and appear well perfused 
                        DVT: No evidence of DVT seen on examination at this time 
                        No calf swelling, no tenderness to calf muscles Lymphatic:  No Evidence of Lymphedema Vascular: Medial Border of Tibia Region: Edema is not present 
                      Pulses: Dorsalis Pedis &  Posterior Tibial Pulses : Palpable yes                        Varicosities Lower Limbs: None noted . Neuro: Negative bilateral Straight leg raise (seated position) 
                    See Musculoskeletal section for pertinent individual extremity examination 
                     No abnormal hand/wrist, foot/ankle, or facial/neck tremors. 
                     Extensor mechanism is intact CHART REVIEW Past Medical History:  
Diagnosis Date  BPH (benign prostatic hypertrophy)  GERD (gastroesophageal reflux disease)  Herniated nucleus pulposus  High cholesterol  Hypertension Current Outpatient Medications Medication Sig  
 meloxicam (MOBIC) 15 mg tablet Take 1 Tab by mouth daily (with breakfast).  naproxen sodium (ALEVE) 220 mg tablet Take 220 mg by mouth two (2) times daily (with meals).  tamsulosin (FLOMAX) 0.4 mg capsule Take 1 Cap by mouth daily (after dinner).  ibuprofen (MOTRIN) 800 mg tablet Take  by mouth.  atorvastatin (LIPITOR) 40 mg tablet Take  by mouth daily. No current facility-administered medications for this visit. No Known Allergies Past Surgical History:  
Procedure Laterality Date  HX COLONOSCOPY Social History Occupational History  Not on file Tobacco Use  Smoking status: Never Smoker  Smokeless tobacco: Never Used Substance and Sexual Activity  Alcohol use: Yes Alcohol/week: 12.6 oz Types: 7 Cans of beer, 14 Shots of liquor per week  Drug use: No  
 Sexual activity: Yes  
  Partners: Female Birth control/protection: None Family History Problem Relation Age of Onset  Hypertension Father  Stroke Mother  Cancer Mother REVIEW OF SYSTEMS : 11/12/2018  ALL BELOW ARE Negative except : SEE HPI Constitutional: Negative for fever, chills and weight loss. Neg Weight Loss Cardiovascular: Negative for chest pain, claudication and leg swelling. SOB, MARTINEZ Gastrointestinal/Urological: Negative for  pain, N/V/D/C, Blood in stool or urine,dysuria                         Hematuria, Incontinence, pelvic pain Musculoskeletal: see HPI. Neurological: Negative for dizziness and weakness, headaches,Visual Changes             Confusion,  Or Seizures, Psychiatric/Behavioral: Negative for depression, memory loss and substance abuse. Extremities:  Negative for hair changes, rash or skin lesion changes. Hematologic: Negative for Bleeding problems, bruising, pallor or swollen lymph nodes. Peripheral Vascular: No calf pain, vascular vein tenderness to calf pain No calf throbbing, posterior knee throbbing pain DIAGNOSTIC IMAGING No notes on file Please see above section of this report. I have personally reviewed the results of the above study. The interpretation of this study is my professional opinion. Written by Vasu Cabrera, as dictated by Dr. Kira Perez. I, Dr. Kira Perez, confirm that all documentation is accurate.

## 2018-11-12 NOTE — PATIENT INSTRUCTIONS
Euflexxa Brochure provided  Patient will advise when he is ready to start the injection series  Try over the counter Biofreeze or Asper Cream with Lidocaine as directed  Take over the counter Motrin as needed as directed. Please take Prilosec while taking this medication. Complete knee exercises for Quad strengthening  Knee brace provided for comfort  Follow up in 5 weeks         Knee Arthritis: Care Instructions  Your Care Instructions    Knee arthritis is a breakdown of the cartilage that cushions your knee joint. When the cartilage wears down, your bones rub against each other. This causes pain and stiffness. Knee arthritis tends to get worse with time. Treatment for knee arthritis involves reducing pain, making the leg muscles stronger, and staying at a healthy body weight. The treatment usually does not improve the health of the cartilage, but it can reduce pain and improve how well your knee works. You can take simple measures to protect your knee joints, ease your pain, and help you stay active. Follow-up care is a key part of your treatment and safety. Be sure to make and go to all appointments, and call your doctor if you are having problems. It's also a good idea to know your test results and keep a list of the medicines you take. How can you care for yourself at home? · Know that knee arthritis will cause more pain on some days than on others. · Stay at a healthy weight. Lose weight if you are overweight. When you stand up, the pressure on your knees from every pound of body weight is multiplied four times. So if you lose 10 pounds, you will reduce the pressure on your knees by 40 pounds. · Talk to your doctor or physical therapist about exercises that will help ease joint pain. ? Stretch to help prevent stiffness and to prevent injury before you exercise. You may enjoy gentle forms of yoga to help keep your knee joints and muscles flexible. ? Walk instead of jog.  ? Ride a bike.  This makes your thigh muscles stronger and takes pressure off your knee. ? Wear well-fitting and comfortable shoes. ? Exercise in chest-deep water. This can help you exercise longer with less pain. ? Avoid exercises that include squatting or kneeling. They can put a lot of strain on your knees. ? Talk to your doctor to make sure that the exercise you do is not making the arthritis worse. · Do not sit for long periods of time. Try to walk once in a while to keep your knee from getting stiff. · Ask your doctor or physical therapist whether shoe inserts may reduce your arthritis pain. · If you can afford it, get new athletic shoes at least every year. This can help reduce the strain on your knees. · Use a device to help you do everyday activities. ? A cane or walking stick can help you keep your balance when you walk. Hold the cane or walking stick in the hand opposite the painful knee. ? If you feel like you may fall when you walk, try using crutches or a front-wheeled walker. These can prevent falls that could cause more damage to your knee. ? A knee brace may help keep your knee stable and prevent pain. ? You also can use other things to make life easier, such as a higher toilet seat and handrails in the bathtub or shower. · Take pain medicines exactly as directed. ? Do not wait until you are in severe pain. You will get better results if you take it sooner. ? If you are not taking a prescription pain medicine, take an over-the-counter medicine such as acetaminophen (Tylenol), ibuprofen (Advil, Motrin), or naproxen (Aleve). Read and follow all instructions on the label. ? Do not take two or more pain medicines at the same time unless the doctor told you to. Many pain medicines have acetaminophen, which is Tylenol. Too much acetaminophen (Tylenol) can be harmful. ? Tell your doctor if you take a blood thinner, have diabetes, or have allergies to shellfish.   · Ask your doctor if you might benefit from a shot of steroid medicine into your knee. This may provide pain relief for several months. · Many people take the supplements glucosamine and chondroitin for osteoarthritis. Some people feel they help, but the medical research does not show that they work. Talk to your doctor before you take these supplements. When should you call for help? Call your doctor now or seek immediate medical care if:    · You have sudden swelling, warmth, or pain in your knee.     · You have knee pain and a fever or rash.     · You have such bad pain that you cannot use your knee.    Watch closely for changes in your health, and be sure to contact your doctor if you have any problems. Where can you learn more? Go to http://candelario-irineo.info/. Enter J581 in the search box to learn more about \"Knee Arthritis: Care Instructions. \"  Current as of: June 11, 2018  Content Version: 11.8  © 1917-0360 [employer]. Care instructions adapted under license by [employer] (which disclaims liability or warranty for this information). If you have questions about a medical condition or this instruction, always ask your healthcare professional. [address] any warranty or liability for your use of this information. Patellofemoral Pain Syndrome (Runner's Knee): Exercises  Your Care Instructions  Here are some examples of typical rehabilitation exercises for your condition. Start each exercise slowly. Ease off the exercise if you start to have pain. Your doctor or physical therapist will tell you when you can start these exercises and which ones will work best for you. How to do the exercises  Calf wall stretch    1. Stand facing a wall with your hands on the wall at about eye level. Put your affected leg about a step behind your other leg.   2. Keeping your back leg straight and your back heel on the floor, bend your front knee and gently bring your hip and chest toward the wall until you feel a stretch in the calf of your back leg. 3. Hold the stretch for at least 15 to 30 seconds. 4. Repeat 2 to 4 times. 5. Repeat steps 1 through 4, but this time keep your back knee bent. Quadriceps stretch    1. If you are not steady on your feet, hold on to a chair, counter, or wall. 2. Bend your affected leg, and reach behind you to grab the front of your foot or ankle with the hand on the same side. For example, if you are stretching your right leg, use your right hand. 3. Keeping your knees next to each other, pull your foot toward your buttock until you feel a gentle stretch across the front of your hip and down the front of your thigh. Your knee should be pointed directly to the ground, and not out to the side. 4. Hold the stretch for at least 15 to 30 seconds. 5. Repeat 2 to 4 times. Hamstring wall stretch    1. Lie on your back in a doorway, with your good leg through the open door. 2. Slide your affected leg up the wall to straighten your knee. You should feel a gentle stretch down the back of your leg. 1. Do not arch your back. 2. Do not bend either knee. 3. Keep one heel touching the floor and the other heel touching the wall. Do not point your toes. 3. Hold the stretch for at least 1 minute. Then over time, try to lengthen the time you hold the stretch to as long as 6 minutes. 4. Repeat 2 to 4 times. 5. If you do not have a place to do this exercise in a doorway, there is another way to do it:  6. Lie on your back, and bend your affected leg. 7. Loop a towel under the ball and toes of that foot, and hold the ends of the towel in your hands. 8. Straighten your knee, and slowly pull back on the towel. You should feel a gentle stretch down the back of your leg. 9. Hold the stretch for at least 15 to 30 seconds. Or even better, hold the stretch for 1 minute if you can. 10. Repeat 2 to 4 times. Quad sets    1.  Sit with your affected leg straight and supported on the floor or a firm bed. Place a small, rolled-up towel under your affected knee. Your other leg should be bent, with that foot flat on the floor. 2. Tighten the thigh muscles of your affected leg by pressing the back of your knee down into the towel. 3. Hold for about 6 seconds, then rest for up to 10 seconds. 4. Repeat 8 to 12 times. Straight-leg raises to the front    1. Lie on your back with your good knee bent so that your foot rests flat on the floor. Your affected leg should be straight. Make sure that your low back has a normal curve. You should be able to slip your hand in between the floor and the small of your back, with your palm touching the floor and your back touching the back of your hand. 2. Tighten the thigh muscles in your affected leg by pressing the back of your knee flat down to the floor. Hold your knee straight. 3. Keeping the thigh muscles tight and your leg straight, lift your affected leg up so that your heel is about 12 inches off the floor. 4. Hold for about 6 seconds, then lower your leg slowly. Rest for up to 10 seconds between repetitions. 5. Repeat 8 to 12 times. Straight-leg raises to the back    1. Lie on your stomach, and lift your leg straight up behind you (toward the ceiling). 2. Lift your toes about 6 inches off the floor, hold for about 6 seconds, then lower slowly. 3. Do 8 to 12 repetitions. Wall slide with ball squeeze    1. Stand with your back against a wall and with your feet about shoulder-width apart. Your feet should be about 12 inches away from the wall. 2. Put a ball about the size of a soccer ball between your knees. Then slowly slide down the wall until your knees are bent about 20 to 30 degrees. 3. Tighten your thigh muscles by squeezing the ball between your knees. Hold that position for about 10 seconds, then stop squeezing. Rest for up to 10 seconds between repetitions. 4. Repeat 8 to 12 times.     Follow-up care is a key part of your treatment and safety. Be sure to make and go to all appointments, and call your doctor if you are having problems. It's also a good idea to know your test results and keep a list of the medicines you take. Where can you learn more? Go to http://candelario-irineo.info/. Enter A404 in the search box to learn more about \"Patellofemoral Pain Syndrome (Runner's Knee): Exercises. \"  Current as of: November 29, 2017  Content Version: 11.8  © 0850-1937 [employer]. Care instructions adapted under license by [employer] (which disclaims liability or warranty for this information). If you have questions about a medical condition or this instruction, always ask your healthcare professional. [address] any warranty or liability for your use of this information.

## 2018-11-12 NOTE — PROGRESS NOTES
Patient: Librado Melgar                     MRN: [id number]       SSN: [id number]  YOB: 1960                      AGE: 66 y.o. SEX: male    Vishal Castle MD      Chief Complaint:   Chief Complaint   Patient presents with    Knee Pain     right knee pain            HPI:     Librado Melgar is a 66 y.o. male who presents today for evaluation of right knee osteoarthritis. Patient was last seen in the office on 10/22/2018 by Dr. Roe Snider. The patient's prior history is detailed in the previous encounter. At the last visit, the patient's plan was as follows:        Plan:    1) Cortisone injection to the right knee: 1 mL Kenalog and 3 mL Lidocaine. 2) Use cryotherapy as directed. 3) Anticipate MRI of the right knee if condition does not improve. 4) RTO - 4 weeks      Since LOV, patient states that the Cortisone injection to the right knee worked well for 2-3 weeks. He has had a previous right knee injection on 6/171/7 which lasted longer than his most recent injection. Dr. Roe Snider discussed Euflexxa injections with the patient at [facility] and he is interested in the injection series, but states that his insurance does not start until January 2019. He has been experiencing major problems with going up/down stairs and with getting in/out of his work vehicle that has stairs. He reports that he has been experiencing popping and grinding and throbbing pain, as well, that wakes him up in the middle of the night. PHYSICAL EXAM:     Visit Vitals  /78   Pulse 66   Temp 97.6 °F (36.4 °C) (Oral)   Resp 16   Ht 5' 7\" (1.702 m)   Wt 231 lb (104.8 kg)   SpO2 98%   BMI 36.18 kg/m²     Pain Scale: 5/10    Appearance: Alert, well appearing and pleasant patient who is in no distress, oriented to person, place/time, and who follows commands. This patient is accompanied in the office by his  self. Psychiatric: Affect and mood are appropriate.    Cardiovascular/Peripheral Vascular: Normal Pulses to each hand and foot      Knees:  Right                         Gait: normal                          Cutaneous: Skin intact, no abrasions, blisters, wounds, erythema                        Effusion: Is not present                        Crepitus:  mild PF joint crepitus                        Tenderness: Medial joint line and Lateral joint line                         Alignment of Knee: mild varus when standing                        ROM: full range with pain                        Fullness or swelling: None to popliteal fossa region                        Stability: No instability to anterior, posterior, varus, valgus stress testing                        Trust: No varus trust with gait                        Contractures: No Achilles or Gastrocnemius Contractures.                         Calf tenderness: Absent for calf or gastrocnemius muscle regions                       Soft, supple, non tender, non taut lower extremity compartments  Extremities:   No embolic phenomena to the toes                           No significant edema to the foot and or toes.                         YVTWH is not present to distal 1/3 tib/fib or ankle regions.                         Lower extremities are warm and appear well perfused                          DVT: No evidence of DVT seen on examination at this time                          No calf swelling, no tenderness to calf muscles  Lymphatic:  No Evidence of Lymphedema  Vascular: Medial Border of Tibia Region: Edema is not present                        Pulses: Dorsalis Pedis &  Posterior Tibial Pulses : Palpable yes                         Varicosities Lower Limbs: None noted .   Neuro: Negative bilateral Straight leg raise (seated position)                      See Musculoskeletal section for pertinent individual extremity examination                       No abnormal hand/wrist, foot/ankle, or facial/neck tremors.                       Extensor mechanism is intact        IMPRESSION:     1. Primary osteoarthritis of right knee    2. Chronic pain of right knee    3. Patellofemoral pain syndrome of right knee          PLAN:         Orders Placed This Encounter    PROCEDURE AUTHORIZATION TO                Euflexxa Brochure provided  Patient will advise when he is ready to start the injection series  Try over the counter Biofreeze or Asper Cream with Lidocaine as directed  Take over the counter Motrin as needed as directed. Please take Prilosec while taking this medication. Complete knee exercises for Quad strengthening  Knee brace provided for comfort  Follow up in 5 weeks          Patient has been discussed with Dr. Maricruz Gama during this visit and he agrees with the assessment and plan. Patient understands treatment plan and has been provided with patient education. PAST MEDICAL HISTORY:     Past Medical History:   Diagnosis Date    BPH (benign prostatic hypertrophy)     GERD (gastroesophageal reflux disease)     Herniated nucleus pulposus     High cholesterol     Hypertension        MEDICATIONS:     Current Outpatient Medications   Medication Sig    meloxicam (MOBIC) 15 mg tablet Take 1 Tab by mouth daily (with breakfast).  naproxen sodium (ALEVE) 220 mg tablet Take 220 mg by mouth two (2) times daily (with meals).  tamsulosin (FLOMAX) 0.4 mg capsule Take 1 Cap by mouth daily (after dinner).  ibuprofen (MOTRIN) 800 mg tablet Take  by mouth.  atorvastatin (LIPITOR) 40 mg tablet Take  by mouth daily. No current facility-administered medications for this visit.         ALLERGIES:     No Known Allergies      PAST SURGICAL HISTORY:     Past Surgical History:   Procedure Laterality Date    HX COLONOSCOPY         SOCIAL HISTORY:     Social History     Socioeconomic History    Marital status:      Spouse name: Not on file    Number of children: Not on file    Years of education: Not on file    Highest education level: Not on file   Social Needs    Financial resource strain: Not on file    Food insecurity - worry: Not on file    Food insecurity - inability: Not on file    Transportation needs - medical: Not on file   DECA needs - non-medical: Not on file   Occupational History    Not on file   Tobacco Use    Smoking status: Never Smoker    Smokeless tobacco: Never Used   Substance and Sexual Activity    Alcohol use: Yes     Alcohol/week: 12.6 oz     Types: 7 Cans of beer, 14 Shots of liquor per week    Drug use: No    Sexual activity: Yes     Partners: Female     Birth control/protection: None   Other Topics Concern    Not on file   Social History Narrative    Not on file       FAMILY HISTORY:     Family History   Problem Relation Age of Onset    Hypertension Father     Stroke Mother     Cancer Mother          REVIEW OF SYSTEMS:     Otherwise as noted in HPI      RADIOGRAPHS & DIAGNOSTIC STUDIES     No results found for any visits on 11/12/18.       Slim Lagunas PA-C  11/12/2018

## 2018-12-05 ENCOUNTER — OFFICE VISIT (OUTPATIENT)
Dept: ORTHOPEDIC SURGERY | Age: 58
End: 2018-12-05

## 2018-12-05 VITALS
HEART RATE: 56 BPM | WEIGHT: 231.4 LBS | OXYGEN SATURATION: 97 % | RESPIRATION RATE: 16 BRPM | TEMPERATURE: 96.7 F | HEIGHT: 67 IN | BODY MASS INDEX: 36.32 KG/M2 | SYSTOLIC BLOOD PRESSURE: 131 MMHG | DIASTOLIC BLOOD PRESSURE: 80 MMHG

## 2018-12-05 DIAGNOSIS — M17.11 PRIMARY OSTEOARTHRITIS OF RIGHT KNEE: Primary | ICD-10-CM

## 2018-12-05 RX ORDER — HYALURONATE SODIUM 10 MG/ML
2 SYRINGE (ML) INTRAARTICULAR ONCE
Qty: 2 ML | Refills: 0 | Status: SHIPPED | COMMUNITY
Start: 2018-12-05 | End: 2018-12-05

## 2018-12-05 NOTE — PATIENT INSTRUCTIONS
Modify activities today   Apply ice (protecting skin) if needed  Follow up in 1 week for Euflexxa #2 of 3 to the right knee         Joint Injections: Care Instructions  Your Care Instructions    Joint injections are shots into a joint, such as the knee. They may be used to put in medicines, such as pain relievers. A corticosteroid, or steroid, shot is used to reduce inflammation in tendons or joints. It is often used to treat problems such as arthritis, tendinitis, and bursitis. Steroids can be injected directly into a painful, inflamed joint. They can also help reduce inflammation of a bursa. A bursa is a sac of fluid. It cushions and lubricates areas where tendons, ligaments, skin, muscles, or bones rub against each other. A steroid shot can sometimes help with short-term pain relief when other treatments haven't worked. If steroid shots help, pain may improve for weeks or months. Follow-up care is a key part of your treatment and safety. Be sure to make and go to all appointments, and call your doctor if you are having problems. It's also a good idea to know your test results and keep a list of the medicines you take. How can you care for yourself at home? · Put ice or a cold pack on the area for 10 to 20 minutes at a time. Put a thin cloth between the ice and your skin. · Ask your doctor if you can take an over-the-counter pain medicine, such as acetaminophen (Tylenol), ibuprofen (Advil, Motrin), or naproxen (Aleve). Be safe with medicines. Read and follow all instructions on the label. · Avoid strenuous activities for several days. In particular, avoid ones that put stress on the area where you got the shot. · If you have dressings over the area, keep them clean and dry. You may remove them when your doctor tells you to. When should you call for help?   Call your doctor now or seek immediate medical care if:    · You have signs of infection, such as:  ? Increased pain, swelling, warmth, or redness. ? Red streaks leading from the site. ? Pus draining from the site. ? A fever.    Watch closely for changes in your health, and be sure to contact your doctor if you have any problems. Where can you learn more? Go to http://candelario-irineo.info/. Enter N616 in the search box to learn more about \"Joint Injections: Care Instructions. \"  Current as of: November 29, 2017  Content Version: 11.8  © 1991-7980 [employer]. Care instructions adapted under license by [employer] (which disclaims liability or warranty for this information). If you have questions about a medical condition or this instruction, always ask your healthcare professional. [place] [address] any warranty or liability for your use of this information.           Liane Lara PA-C  12/5/2018 4:31 PM

## 2018-12-05 NOTE — PROGRESS NOTES
AMBULATORY PROGRESS NOTE      Patient: Valeria Bradford             MRN: [id number]     SSN: [id number] Body mass index is 36.24 kg/m². YOB: 1960     AGE: 66 y.o. EX: male    PCP: Luis Perez MD     IMPRESSION/DIAGNOSIS AND TREATMENT PLAN     DIAGNOSES  1. Primary osteoarthritis of right knee        Orders Placed This Encounter    NM DRAIN/INJECT LARGE JOINT/BURSA - 20610    sodium hyaluronate (SUPARTZ FX/HYALGAN/GENIVSC) 10 mg/mL syrg injection      Valeria Bradford understands his diagnoses and the proposed plan. Plan:    The patient is here for his first Euflexxa injection to the right knee. He has osteoarthritic changes seen to the right knee. PLAN:  We will see him in one week for his second Euflexxa injection for the right knee. He tolerated the procedure well. HPI AND EXAMINATION     Sasha Larry IS A 66 y.o. male who presents to my outpatient office for follow up of primary osteoarthritis of the right knee. At the last visit, Ann Marte PA-C, provided information on Euflexxa injections, instructed the patient to continue activity modification as directed, and to use cryotherapy as directed. Since we saw him last,  He still has some right knee pain// no locking    Visit Vitals  /80   Pulse (!) 56   Temp 96.7 °F (35.9 °C) (Oral)   Resp 16   Ht 5' 7\" (1.702 m)   Wt 231 lb 6.4 oz (105 kg)   SpO2 97%   BMI 36.24 kg/m²       Appearance: Alert, well appearing and pleasant patient who is in no distress, oriented to person, place/time, and who follows commands. This patient is accompanied in the office by his  self. Psychiatric: Affect and mood are appropriate.    Cardiovascular/Peripheral Vascular: Normal Pulses to each hand and foot      Knees:  Right                         Gait: normal                          Cutaneous: Skin intact, no abrasions, blisters, wounds, erythema                        Effusion: Is not present                        Crepitus:  mild PF joint crepitus                        Tenderness: Medial joint line and Lateral joint line                         Alignment of Knee: mild varus when standing                        ROM: full range with pain                        Fullness or swelling: None to popliteal fossa region                        Stability: No instability to anterior, posterior, varus, valgus stress testing                        Trust: No varus trust with gait                        Contractures: No Achilles or Gastrocnemius Contractures.                         Calf tenderness: Absent for calf or gastrocnemius muscle regions                       Soft, supple, non tender, non taut lower extremity compartments  Extremities:   No embolic phenomena to the toes                           No significant edema to the foot and or toes.                         DNBHN is not present to distal 1/3 tib/fib or ankle regions.                         Lower extremities are warm and appear well perfused                          DVT: No evidence of DVT seen on examination at this time                          No calf swelling, no tenderness to calf muscles  Lymphatic:  No Evidence of Lymphedema  Vascular: Medial Border of Tibia Region: Edema is not present                        Pulses: Dorsalis Pedis &  Posterior Tibial Pulses : Palpable yes                         Varicosities Lower Limbs: None noted .   Neuro: Negative bilateral Straight leg raise (seated position)                      See Musculoskeletal section for pertinent individual extremity examination                       No abnormal hand/wrist, foot/ankle, or facial/neck tremors.                       Extensor mechanism is intact    CHART REVIEW     Past Medical History:   Diagnosis Date    BPH (benign prostatic hypertrophy)     GERD (gastroesophageal reflux disease)     Herniated nucleus pulposus     High cholesterol     Hypertension Current Outpatient Medications   Medication Sig    sodium hyaluronate (SUPARTZ FX/HYALGAN/GENIVSC) 10 mg/mL syrg injection 2 mL by Intra artICUlar route once for 1 dose.  tamsulosin (FLOMAX) 0.4 mg capsule Take 1 Cap by mouth daily (after dinner).  ibuprofen (MOTRIN) 800 mg tablet Take  by mouth.  atorvastatin (LIPITOR) 40 mg tablet Take  by mouth daily.  meloxicam (MOBIC) 15 mg tablet Take 1 Tab by mouth daily (with breakfast).  naproxen sodium (ALEVE) 220 mg tablet Take 220 mg by mouth two (2) times daily (with meals). No current facility-administered medications for this visit. No Known Allergies  Past Surgical History:   Procedure Laterality Date    HX COLONOSCOPY       Social History     Occupational History    Not on file   Tobacco Use    Smoking status: Never Smoker    Smokeless tobacco: Never Used   Substance and Sexual Activity    Alcohol use: Yes     Alcohol/week: 12.6 oz     Types: 7 Cans of beer, 14 Shots of liquor per week    Drug use: No    Sexual activity: Yes     Partners: Female     Birth control/protection: None     Family History   Problem Relation Age of Onset    Hypertension Father     Stroke Mother     Cancer Mother         REVIEW OF SYSTEMS : 12/5/2018  ALL BELOW ARE Negative except : SEE HPI     Constitutional: Negative for fever, chills and weight loss. Neg Weight Loss  Cardiovascular: Negative for chest pain, claudication and leg swelling. SOB, MARTINEZ   Gastrointestinal/Urological: Negative for  pain, N/V/D/C, Blood in stool or urine,dysuria                         Hematuria, Incontinence, pelvic pain  Musculoskeletal: see HPI. Neurological: Negative for dizziness and weakness, headaches,Visual Changes             Confusion,  Or Seizures,   Psychiatric/Behavioral: Negative for depression, memory loss and substance abuse. Extremities:  Negative for hair changes, rash or skin lesion changes.   Hematologic: Negative for Bleeding problems, bruising, pallor or swollen lymph nodes. Peripheral Vascular: No calf pain, vascular vein tenderness to calf pain              No calf throbbing, posterior knee throbbing pain     DIAGNOSTIC IMAGING        PROCEDURE     FOR Brenton Saucedo, THE FOLLOWING IS TRUE:    Brenton Saucedo has a Diagnosis of osteoarthritis and has documentation that the pain interferes with functional activities. Brenton Saucedo has documentation of failure to respond  adequately to at least 3 months of conservative therapy:which (Activity modification, home exercise, protective weight bearing, and various analgesics. NAME is unable to tolerate conservative therapy (prolonged NSAID use) because of adverse effects of NSAIDs (GI irritation, gastric/colonic irritation. Thus visco supplementation is requested as an alternative treatment for knee OA. Injection done by: Sheryl COLON     Brenton Saucedo , is here for the  RIGHT KNEE 1ST Euflexxa injection into right knee. Brenton Saucedo denies any redness, fevers, shakes, chills, or any reaction from the prior Euflexxa injection. This patient is accompanied in the office by his  WIFE. A formal time out was conducted by me informing Brenton Saucedo of the risks and benefits of the infection. The injection was performed 12/5/2018 5:07 PM with sterile technique. The right knee is cleansed with alcohol and then sterilized with Betadine, the medial aspect of the right knee. right  knee was then injected with Euflexxa. he tolerated the procedure well. Band-Aids were applied. The risks of Euflexxa include bleeding, infection, and reactive synovitis. The pain assessment score PRIOR/AFTER to the injection: 4 /10 // too soon at determine due to this immediate injection    /10 pain severity, mild intensity, dull Pain quality. Please see above section of this report. I have personally reviewed the results of the above study. The interpretation of this study is my professional opinion.     Written by Trey Hawthorne, as dictated by Dr. Anna Alicia. I, Dr. Anna Alicia, confirm that all documentation is accurate.

## 2018-12-05 NOTE — PROGRESS NOTES
1. Have you been to the ER, urgent care clinic since your last visit? Hospitalized since your last visit? No    2. Have you seen or consulted any other health care providers outside of the [address] [place] [address] since your last visit? Include any pap smears or colon screening.  No

## 2018-12-12 ENCOUNTER — OFFICE VISIT (OUTPATIENT)
Dept: ORTHOPEDIC SURGERY | Age: 58
End: 2018-12-12

## 2018-12-12 VITALS
DIASTOLIC BLOOD PRESSURE: 90 MMHG | OXYGEN SATURATION: 95 % | BODY MASS INDEX: 36.26 KG/M2 | SYSTOLIC BLOOD PRESSURE: 129 MMHG | HEART RATE: 56 BPM | WEIGHT: 231 LBS | RESPIRATION RATE: 17 BRPM | TEMPERATURE: 96.8 F | HEIGHT: 67 IN

## 2018-12-12 DIAGNOSIS — G89.29 CHRONIC PAIN OF RIGHT KNEE: ICD-10-CM

## 2018-12-12 DIAGNOSIS — M17.11 PRIMARY OSTEOARTHRITIS OF RIGHT KNEE: Primary | ICD-10-CM

## 2018-12-12 DIAGNOSIS — M22.2X1 PATELLOFEMORAL PAIN SYNDROME OF RIGHT KNEE: ICD-10-CM

## 2018-12-12 DIAGNOSIS — M25.561 CHRONIC PAIN OF RIGHT KNEE: ICD-10-CM

## 2018-12-12 RX ORDER — HYALURONATE SODIUM 10 MG/ML
2 SYRINGE (ML) INTRAARTICULAR ONCE
Qty: 2 ML | Refills: 0 | Status: SHIPPED | COMMUNITY
Start: 2018-12-12 | End: 2018-12-12

## 2018-12-12 NOTE — PROGRESS NOTES
AMBULATORY PROGRESS NOTE      Patient: Kenneth Leon             MRN: [id number]     SSN: [id number] Body mass index is 36.18 kg/m². YOB: 1960              AGE: 66 y.o. SEX: male    PCP: Luther Cardozo MD     IMPRESSION/DIAGNOSIS AND TREATMENT PLAN     DIAGNOSES  1. Primary osteoarthritis of right knee    2. Chronic pain of right knee    3. Patellofemoral pain syndrome of right knee        No orders of the defined types were placed in this encounter. Kenneth Leon understands his diagnoses and the proposed plan. Plan:    The patient is here for his second Euflexxa injection to the right knee. He has osteoarthritic changes seen to the right knee. PLAN:  We will see him in one week for his third Euflexxa injection for the right knee. He tolerated the procedure well. HPI AND EXAMINATION     Pushpa Larry IS A 66 y.o. male who presents to my outpatient office for follow up of primary osteoarthritis of the right knee. At the last visit, Ashok Waters PA-C, provided information on Euflexxa injections, instructed the patient to continue activity modification as directed, and to use cryotherapy as directed. Since we saw him last,  He still has some right knee pain// no locking    Visit Vitals  /90   Pulse (!) 56   Temp 96.8 °F (36 °C) (Oral)   Resp 17   Ht 5' 7\" (1.702 m)   Wt 231 lb (104.8 kg)   SpO2 95%   BMI 36.18 kg/m²       Appearance: Alert, well appearing and pleasant patient who is in no distress, oriented to person, place/time, and who follows commands. This patient is accompanied in the office by his  self. Psychiatric: Affect and mood are appropriate.    Cardiovascular/Peripheral Vascular: Normal Pulses to each hand and foot      Knees:  Right                         Gait: normal                          Cutaneous: Skin intact, no abrasions, blisters, wounds, erythema                        Effusion: Is not present                        Crepitus:  mild PF joint crepitus                        Tenderness: Medial joint line and Lateral joint line                         Alignment of Knee: mild varus when standing                        ROM: full range with pain                        Fullness or swelling: None to popliteal fossa region                        Stability: No instability to anterior, posterior, varus, valgus stress testing                        Trust: No varus trust with gait                        Contractures: No Achilles or Gastrocnemius Contractures.                         Calf tenderness: Absent for calf or gastrocnemius muscle regions                       Soft, supple, non tender, non taut lower extremity compartments  Extremities:   No embolic phenomena to the toes                           No significant edema to the foot and or toes.                         KQYBR is not present to distal 1/3 tib/fib or ankle regions.                         Lower extremities are warm and appear well perfused                          DVT: No evidence of DVT seen on examination at this time                          No calf swelling, no tenderness to calf muscles  Lymphatic:  No Evidence of Lymphedema  Vascular: Medial Border of Tibia Region: Edema is not present                        Pulses: Dorsalis Pedis &  Posterior Tibial Pulses : Palpable yes                         Varicosities Lower Limbs: None noted .   Neuro: Negative bilateral Straight leg raise (seated position)                      See Musculoskeletal section for pertinent individual extremity examination                       No abnormal hand/wrist, foot/ankle, or facial/neck tremors.                       Extensor mechanism is intact    CHART REVIEW     Past Medical History:   Diagnosis Date    BPH (benign prostatic hypertrophy)     GERD (gastroesophageal reflux disease)     Herniated nucleus pulposus     High cholesterol     Hypertension Current Outpatient Medications   Medication Sig    meloxicam (MOBIC) 15 mg tablet Take 1 Tab by mouth daily (with breakfast).  naproxen sodium (ALEVE) 220 mg tablet Take 220 mg by mouth two (2) times daily (with meals).  tamsulosin (FLOMAX) 0.4 mg capsule Take 1 Cap by mouth daily (after dinner).  ibuprofen (MOTRIN) 800 mg tablet Take  by mouth.  atorvastatin (LIPITOR) 40 mg tablet Take  by mouth daily. No current facility-administered medications for this visit. No Known Allergies  Past Surgical History:   Procedure Laterality Date    HX COLONOSCOPY       Social History     Occupational History    Not on file   Tobacco Use    Smoking status: Never Smoker    Smokeless tobacco: Never Used   Substance and Sexual Activity    Alcohol use: Yes     Alcohol/week: 12.6 oz     Types: 7 Cans of beer, 14 Shots of liquor per week    Drug use: No    Sexual activity: Yes     Partners: Female     Birth control/protection: None     Family History   Problem Relation Age of Onset    Hypertension Father     Stroke Mother     Cancer Mother         REVIEW OF SYSTEMS : 12/12/2018  ALL BELOW ARE Negative except : SEE HPI     Constitutional: Negative for fever, chills and weight loss. Neg Weight Loss  Cardiovascular: Negative for chest pain, claudication and leg swelling. SOB, MARTINEZ   Gastrointestinal/Urological: Negative for  pain, N/V/D/C, Blood in stool or urine,dysuria                         Hematuria, Incontinence, pelvic pain  Musculoskeletal: see HPI. Neurological: Negative for dizziness and weakness, headaches,Visual Changes             Confusion,  Or Seizures,   Psychiatric/Behavioral: Negative for depression, memory loss and substance abuse. Extremities:  Negative for hair changes, rash or skin lesion changes. Hematologic: Negative for Bleeding problems, bruising, pallor or swollen lymph nodes.   Peripheral Vascular: No calf pain, vascular vein tenderness to calf pain              No calf throbbing, posterior knee throbbing pain     DIAGNOSTIC IMAGING        PROCEDURE     FOR Franklin Sultana, THE FOLLOWING IS TRUE:    Franklin Sultana has a Diagnosis of osteoarthritis and has documentation that the pain interferes with functional activities. Franklin Sultana has documentation of failure to respond  adequately to at least 3 months of conservative therapy:which (Activity modification, home exercise, protective weight bearing, and various analgesics. NAME is unable to tolerate conservative therapy (prolonged NSAID use) because of adverse effects of NSAIDs (GI irritation, gastric/colonic irritation. Thus visco supplementation is requested as an alternative treatment for knee OA. Injection done by: Sobeida Sultana , is here for the  RIGHT KNEE 1ST Euflexxa injection into right knee. Franklin Sultana denies any redness, fevers, shakes, chills, or any reaction from the prior Euflexxa injection. This patient is accompanied in the office by his  WIFE. A formal time out was conducted by me informing Franklin Sultana of the risks and benefits of the infection. The the consent was completed at 4:10 pm and the Euflexxa injection  To the right knee was performed 12/12/2018 4:20 PM with sterile technique. The right knee is cleansed with alcohol and then sterilized with Betadine, the medial aspect of the right knee. right  knee was then injected with Euflexxa. he tolerated the procedure well. Band-Aids were applied. The risks of Euflexxa include bleeding, infection, and reactive synovitis. The pain assessment score PRIOR/AFTER to the injection: 4 /10 // too soon at determine due to this immediate injection    /10 pain severity, mild intensity, dull Pain quality. Please see above section of this report.

## 2018-12-12 NOTE — PROGRESS NOTES
AMBULATORY PROGRESS NOTE      Patient: Red Bush             MRN: [id number]     SSN: [id number] Body mass index is 36.18 kg/m². YOB: 1960     AGE: 66 y.o. EX: male    PCP: Frankie Nyhan., MD     IMPRESSION/DIAGNOSIS AND TREATMENT PLAN     DIAGNOSES  No diagnosis found. No orders of the defined types were placed in this encounter. Red Bush understands his diagnoses and the proposed plan. Plan:    1)  2)    RTO -     HPI AND EXAMINATION     Red Bush IS A 66 y.o. male who presents to my outpatient office for follow up of primary osteoarthritis of the right knee. At the last visit, I provided Euflexxa #1 of 2 to the right knee and instructed the patient to continue activity modification as directed. Since we saw him last, ***    Visit Vitals  /90   Pulse (!) 56   Temp 96.8 °F (36 °C) (Oral)   Resp 17   Ht 5' 7\" (1.702 m)   Wt 231 lb (104.8 kg)   SpO2 95%   BMI 36.18 kg/m²     Appearance: Alert, well appearing and pleasant patient who is in no distress, oriented to person, place/time, and who follows commands. This patient is accompanied in the office by his  self. Psychiatric: Affect and mood are appropriate.    Cardiovascular/Peripheral Vascular: Normal Pulses to each hand and foot      Knees:  Right                         Gait: normal                          Cutaneous: Skin intact, no abrasions, blisters, wounds, erythema                        Effusion: Is not present                        Crepitus:  mild PF joint crepitus                        Tenderness: Medial joint line and Lateral joint line                         Alignment of Knee: mild varus when standing                        ROM: full range with pain                        Fullness or swelling: None to popliteal fossa region                        Stability: No instability to anterior, posterior, varus, valgus stress testing                        Trust: No varus trust with gait                        Contractures: No Achilles or Gastrocnemius Contractures.                         Calf tenderness: Absent for calf or gastrocnemius muscle regions                       Soft, supple, non tender, non taut lower extremity compartments  Extremities:   No embolic phenomena to the toes                           No significant edema to the foot and or toes.                         XFJVR is not present to distal 1/3 tib/fib or ankle regions.                         Lower extremities are warm and appear well perfused                          DVT: No evidence of DVT seen on examination at this time                          No calf swelling, no tenderness to calf muscles  Lymphatic:  No Evidence of Lymphedema  Vascular: Medial Border of Tibia Region: Edema is not present                        Pulses: Dorsalis Pedis &  Posterior Tibial Pulses : Palpable yes                         Varicosities Lower Limbs: None noted . Neuro: Negative bilateral Straight leg raise (seated position)                      See Musculoskeletal section for pertinent individual extremity examination                       No abnormal hand/wrist, foot/ankle, or facial/neck tremors.                       Extensor mechanism is intact    CHART REVIEW     Past Medical History:   Diagnosis Date    BPH (benign prostatic hypertrophy)     GERD (gastroesophageal reflux disease)     Herniated nucleus pulposus     High cholesterol     Hypertension      Current Outpatient Medications   Medication Sig    meloxicam (MOBIC) 15 mg tablet Take 1 Tab by mouth daily (with breakfast).  naproxen sodium (ALEVE) 220 mg tablet Take 220 mg by mouth two (2) times daily (with meals).  tamsulosin (FLOMAX) 0.4 mg capsule Take 1 Cap by mouth daily (after dinner).  ibuprofen (MOTRIN) 800 mg tablet Take  by mouth.  atorvastatin (LIPITOR) 40 mg tablet Take  by mouth daily. No current facility-administered medications for this visit. No Known Allergies  Past Surgical History:   Procedure Laterality Date    HX COLONOSCOPY       Social History     Occupational History    Not on file   Tobacco Use    Smoking status: Never Smoker    Smokeless tobacco: Never Used   Substance and Sexual Activity    Alcohol use: Yes     Alcohol/week: 12.6 oz     Types: 7 Cans of beer, 14 Shots of liquor per week    Drug use: No    Sexual activity: Yes     Partners: Female     Birth control/protection: None     Family History   Problem Relation Age of Onset    Hypertension Father     Stroke Mother     Cancer Mother         REVIEW OF SYSTEMS : 12/12/2018  ALL BELOW ARE Negative except : SEE HPI     Constitutional: Negative for fever, chills and weight loss. Neg Weight Loss  Cardiovascular: Negative for chest pain, claudication and leg swelling. SOB, MARTINEZ   Gastrointestinal/Urological: Negative for  pain, N/V/D/C, Blood in stool or urine,dysuria                         Hematuria, Incontinence, pelvic pain  Musculoskeletal: see HPI. Neurological: Negative for dizziness and weakness, headaches,Visual Changes             Confusion,  Or Seizures,   Psychiatric/Behavioral: Negative for depression, memory loss and substance abuse. Extremities:  Negative for hair changes, rash or skin lesion changes. Hematologic: Negative for Bleeding problems, bruising, pallor or swollen lymph nodes. Peripheral Vascular: No calf pain, vascular vein tenderness to calf pain              No calf throbbing, posterior knee throbbing pain     DIAGNOSTIC IMAGING     No notes on file    Please see above section of this report. I have personally reviewed the results of the above study. The interpretation of this study is my professional opinion. Written by Juan Alberto Raphael, as dictated by Dr. Sultana Allen. I, Dr. Sultana Allen, confirm that all documentation is accurate.

## 2018-12-12 NOTE — PROGRESS NOTES
1. Have you been to the ER, urgent care clinic since your last visit? Hospitalized since your last visit? No    2. Have you seen or consulted any other health care providers outside of the [address] [place] [address] since your last visit? Include any pap smears or colon screening.  No Yes

## 2018-12-12 NOTE — PATIENT INSTRUCTIONS
Modify activities today   Apply ice (protecting skin) if needed  Follow up in 1 week for Euflexxa #3 of 3 to the right knee         Joint Injections: Care Instructions  Your Care Instructions    Joint injections are shots into a joint, such as the knee. They may be used to put in medicines, such as pain relievers. A corticosteroid, or steroid, shot is used to reduce inflammation in tendons or joints. It is often used to treat problems such as arthritis, tendinitis, and bursitis. Steroids can be injected directly into a painful, inflamed joint. They can also help reduce inflammation of a bursa. A bursa is a sac of fluid. It cushions and lubricates areas where tendons, ligaments, skin, muscles, or bones rub against each other. A steroid shot can sometimes help with short-term pain relief when other treatments haven't worked. If steroid shots help, pain may improve for weeks or months. Follow-up care is a key part of your treatment and safety. Be sure to make and go to all appointments, and call your doctor if you are having problems. It's also a good idea to know your test results and keep a list of the medicines you take. How can you care for yourself at home? · Put ice or a cold pack on the area for 10 to 20 minutes at a time. Put a thin cloth between the ice and your skin. · Ask your doctor if you can take an over-the-counter pain medicine, such as acetaminophen (Tylenol), ibuprofen (Advil, Motrin), or naproxen (Aleve). Be safe with medicines. Read and follow all instructions on the label. · Avoid strenuous activities for several days. In particular, avoid ones that put stress on the area where you got the shot. · If you have dressings over the area, keep them clean and dry. You may remove them when your doctor tells you to. When should you call for help?   Call your doctor now or seek immediate medical care if:    · You have signs of infection, such as:  ? Increased pain, swelling, warmth, or redness. ? Red streaks leading from the site. ? Pus draining from the site. ? A fever.    Watch closely for changes in your health, and be sure to contact your doctor if you have any problems. Where can you learn more? Go to http://candelario-irineo.info/. Enter N616 in the search box to learn more about \"Joint Injections: Care Instructions. \"  Current as of: November 29, 2017  Content Version: 11.8  © 3086-8416 [email or web address]. Care instructions adapted under license by [employer] (which disclaims liability or warranty for this information). If you have questions about a medical condition or this instruction, always ask your healthcare professional. [place] [address] any warranty or liability for your use of this information.           Susana Alvarado PA-C  12/12/2018 4:31 PM

## 2018-12-19 ENCOUNTER — OFFICE VISIT (OUTPATIENT)
Dept: ORTHOPEDIC SURGERY | Age: 58
End: 2018-12-19

## 2018-12-19 VITALS
BODY MASS INDEX: 36.44 KG/M2 | WEIGHT: 232.2 LBS | SYSTOLIC BLOOD PRESSURE: 125 MMHG | HEART RATE: 69 BPM | OXYGEN SATURATION: 93 % | DIASTOLIC BLOOD PRESSURE: 79 MMHG | TEMPERATURE: 97 F | HEIGHT: 67 IN | RESPIRATION RATE: 15 BRPM

## 2018-12-19 DIAGNOSIS — G89.29 CHRONIC PAIN OF RIGHT KNEE: ICD-10-CM

## 2018-12-19 DIAGNOSIS — M17.11 PRIMARY OSTEOARTHRITIS OF RIGHT KNEE: Primary | ICD-10-CM

## 2018-12-19 DIAGNOSIS — M22.2X1 PATELLOFEMORAL PAIN SYNDROME OF RIGHT KNEE: ICD-10-CM

## 2018-12-19 DIAGNOSIS — M25.561 CHRONIC PAIN OF RIGHT KNEE: ICD-10-CM

## 2018-12-19 RX ORDER — HYALURONATE SODIUM 10 MG/ML
2 SYRINGE (ML) INTRAARTICULAR ONCE
Qty: 2 ML | Refills: 0 | Status: SHIPPED | COMMUNITY
Start: 2018-12-19 | End: 2018-12-19

## 2018-12-19 NOTE — PROGRESS NOTES
AMBULATORY PROGRESS NOTE      Patient: Judd Burks             MRN: [id number]     SSN: [id number] There is no height or weight on file to calculate BMI. YOB: 1960     AGE: 66 y.o. EX: male    PCP: Tavares Aviles MD     IMPRESSION/DIAGNOSIS AND TREATMENT PLAN     DIAGNOSES  No diagnosis found. No orders of the defined types were placed in this encounter. Judd Burks understands his diagnoses and the proposed plan. Plan:    1)  2)    RTO -     HPI AND EXAMINATION     Judd Burks IS A 66 y.o. male who presents to my outpatient office for follow up of primary osteoarthritis of the right knee and patellofemoral pain syndrome of the right knee. At the last visit, Chris Franks PA-C, provided Euflexxa injection #2 of 3 to the right knee. Since we saw him last, ***    There were no vitals taken for this visit. Appearance: Alert, well appearing and pleasant patient who is in no distress, oriented to person, place/time, and who follows commands. This patient is accompanied in the office by his  self. Psychiatric: Affect and mood are appropriate. Cardiovascular/Peripheral Vascular: Normal Pulses to each hand and foot      Knees:  Right                         Gait: normal                          Cutaneous: Skin intact, no abrasions, blisters, wounds, erythema                        Effusion: Is not present                        Crepitus:  mild PF joint crepitus                        Tenderness: Medial joint line and Lateral joint line                         Alignment of Knee: mild varus when standing                        ROM: full range with pain                        Fullness or swelling: None to popliteal fossa region                        Stability: No instability to anterior, posterior, varus, valgus stress testing                        Trust: No varus trust with gait                        Contractures: No Achilles or Gastrocnemius Contractures.                       Calf tenderness: Absent for calf or gastrocnemius muscle regions                       Soft, supple, non tender, non taut lower extremity compartments  Extremities:   No embolic phenomena to the toes                           No significant edema to the foot and or toes.                         KJZCG is not present to distal 1/3 tib/fib or ankle regions.                         Lower extremities are warm and appear well perfused                          DVT: No evidence of DVT seen on examination at this time                          No calf swelling, no tenderness to calf muscles  Lymphatic:  No Evidence of Lymphedema  Vascular: Medial Border of Tibia Region: Edema is not present                        Pulses: Dorsalis Pedis &  Posterior Tibial Pulses : Palpable yes                         Varicosities Lower Limbs: None noted . Neuro: Negative bilateral Straight leg raise (seated position)                      See Musculoskeletal section for pertinent individual extremity examination                       No abnormal hand/wrist, foot/ankle, or facial/neck tremors.                       Extensor mechanism is intact    CHART REVIEW     Past Medical History:   Diagnosis Date    BPH (benign prostatic hypertrophy)     GERD (gastroesophageal reflux disease)     Herniated nucleus pulposus     High cholesterol     Hypertension      Current Outpatient Medications   Medication Sig    meloxicam (MOBIC) 15 mg tablet Take 1 Tab by mouth daily (with breakfast).  naproxen sodium (ALEVE) 220 mg tablet Take 220 mg by mouth two (2) times daily (with meals).  tamsulosin (FLOMAX) 0.4 mg capsule Take 1 Cap by mouth daily (after dinner).  ibuprofen (MOTRIN) 800 mg tablet Take  by mouth.  atorvastatin (LIPITOR) 40 mg tablet Take  by mouth daily. No current facility-administered medications for this visit.       No Known Allergies  Past Surgical History:   Procedure Laterality Date    HX COLONOSCOPY       Social History     Occupational History    Not on file   Tobacco Use    Smoking status: Never Smoker    Smokeless tobacco: Never Used   Substance and Sexual Activity    Alcohol use: Yes     Alcohol/week: 12.6 oz     Types: 7 Cans of beer, 14 Shots of liquor per week    Drug use: No    Sexual activity: Yes     Partners: Female     Birth control/protection: None     Family History   Problem Relation Age of Onset    Hypertension Father     Stroke Mother     Cancer Mother         REVIEW OF SYSTEMS : 12/19/2018  ALL BELOW ARE Negative except : SEE HPI     Constitutional: Negative for fever, chills and weight loss. Neg Weight Loss  Cardiovascular: Negative for chest pain, claudication and leg swelling. SOB, MARTINEZ   Gastrointestinal/Urological: Negative for  pain, N/V/D/C, Blood in stool or urine,dysuria                         Hematuria, Incontinence, pelvic pain  Musculoskeletal: see HPI. Neurological: Negative for dizziness and weakness, headaches,Visual Changes             Confusion,  Or Seizures,   Psychiatric/Behavioral: Negative for depression, memory loss and substance abuse. Extremities:  Negative for hair changes, rash or skin lesion changes. Hematologic: Negative for Bleeding problems, bruising, pallor or swollen lymph nodes. Peripheral Vascular: No calf pain, vascular vein tenderness to calf pain              No calf throbbing, posterior knee throbbing pain     DIAGNOSTIC IMAGING     No notes on file    Please see above section of this report. I have personally reviewed the results of the above study. The interpretation of this study is my professional opinion. Written by Terra Parker, as dictated by Dr. Agnes Morrison. I, Dr. Agnes Morrison, confirm that all documentation is accurate.

## 2018-12-19 NOTE — PROGRESS NOTES
AMBULATORY PROGRESS NOTE      Patient: Kemper Goldberg             MRN: [id number]     SSN: [id number] Body mass index is 36.37 kg/m². YOB: 1960              AGE: 66 y.o. SEX: male    PCP: Iban Nelson MD     IMPRESSION/DIAGNOSIS AND TREATMENT PLAN     DIAGNOSES  1. Primary osteoarthritis of right knee    2. Chronic pain of right knee    3. Patellofemoral pain syndrome of right knee        No orders of the defined types were placed in this encounter. Kemper Goldberg understands his diagnoses and the proposed plan. Plan:    The patient is here for his third Euflexxa injection to the right knee. He has osteoarthritic changes seen to the right knee. He has noticed good improvement since his last injection. PLAN:  We will see him as needed for his right knee. He tolerated the procedure well. HPI AND EXAMINATION     Jaelyn Larry IS A 66 y.o. male who presents to my outpatient office for follow up of primary osteoarthritis of the right knee. At the last visit, Katy Reyes PA-C, provided the second Euflexxa injection, instructed the patient to continue activity modification as directed, and to use cryotherapy as directed. Since we saw him last,  He still has noticed good improvement in his right knee pain. Visit Vitals  /79   Pulse 69   Temp 97 °F (36.1 °C) (Oral)   Resp 15   Ht 5' 7\" (1.702 m)   Wt 232 lb 3.2 oz (105.3 kg)   SpO2 93%   BMI 36.37 kg/m²       Appearance: Alert, well appearing and pleasant patient who is in no distress, oriented to person, place/time, and who follows commands. This patient is accompanied in the office by his  self. Psychiatric: Affect and mood are appropriate.    Cardiovascular/Peripheral Vascular: Normal Pulses to each hand and foot      Knees:  Right                         Gait: normal                          Cutaneous: Skin intact, no abrasions, blisters, wounds, erythema                        Effusion: Is not present                        Crepitus:  mild PF joint crepitus                        Tenderness: Medial joint line and Lateral joint line                         Alignment of Knee: mild varus when standing                        ROM: full range with pain                        Fullness or swelling: None to popliteal fossa region                        Stability: No instability to anterior, posterior, varus, valgus stress testing                        Trust: No varus trust with gait                        Contractures: No Achilles or Gastrocnemius Contractures.                         Calf tenderness: Absent for calf or gastrocnemius muscle regions                       Soft, supple, non tender, non taut lower extremity compartments  Extremities:   No embolic phenomena to the toes                           No significant edema to the foot and or toes.                         NXVIL is not present to distal 1/3 tib/fib or ankle regions.                         Lower extremities are warm and appear well perfused                          DVT: No evidence of DVT seen on examination at this time                          No calf swelling, no tenderness to calf muscles  Lymphatic:  No Evidence of Lymphedema  Vascular: Medial Border of Tibia Region: Edema is not present                        Pulses: Dorsalis Pedis &  Posterior Tibial Pulses : Palpable yes                         Varicosities Lower Limbs: None noted .   Neuro: Negative bilateral Straight leg raise (seated position)                      See Musculoskeletal section for pertinent individual extremity examination                       No abnormal hand/wrist, foot/ankle, or facial/neck tremors.                       Extensor mechanism is intact    CHART REVIEW     Past Medical History:   Diagnosis Date    BPH (benign prostatic hypertrophy)     GERD (gastroesophageal reflux disease)     Herniated nucleus pulposus     High cholesterol     Hypertension Current Outpatient Medications   Medication Sig    meloxicam (MOBIC) 15 mg tablet Take 1 Tab by mouth daily (with breakfast).  naproxen sodium (ALEVE) 220 mg tablet Take 220 mg by mouth two (2) times daily (with meals).  tamsulosin (FLOMAX) 0.4 mg capsule Take 1 Cap by mouth daily (after dinner).  ibuprofen (MOTRIN) 800 mg tablet Take  by mouth.  atorvastatin (LIPITOR) 40 mg tablet Take  by mouth daily. No current facility-administered medications for this visit. No Known Allergies  Past Surgical History:   Procedure Laterality Date    HX COLONOSCOPY       Social History     Occupational History    Not on file   Tobacco Use    Smoking status: Never Smoker    Smokeless tobacco: Never Used   Substance and Sexual Activity    Alcohol use: Yes     Alcohol/week: 12.6 oz     Types: 7 Cans of beer, 14 Shots of liquor per week    Drug use: No    Sexual activity: Yes     Partners: Female     Birth control/protection: None     Family History   Problem Relation Age of Onset    Hypertension Father     Stroke Mother     Cancer Mother         REVIEW OF SYSTEMS : 12/19/2018  ALL BELOW ARE Negative except : SEE HPI     Constitutional: Negative for fever, chills and weight loss. Neg Weight Loss  Cardiovascular: Negative for chest pain, claudication and leg swelling. SOB, MARTINEZ   Gastrointestinal/Urological: Negative for  pain, N/V/D/C, Blood in stool or urine,dysuria                         Hematuria, Incontinence, pelvic pain  Musculoskeletal: see HPI. Neurological: Negative for dizziness and weakness, headaches,Visual Changes               Confusion,  Or Seizures,   Psychiatric/Behavioral: Negative for depression, memory loss and substance abuse. Extremities:  Negative for hair changes, rash or skin lesion changes. Hematologic: Negative for Bleeding problems, bruising, pallor or swollen lymph nodes.   Peripheral Vascular: No calf pain, vascular vein tenderness to calf pain              No calf throbbing, posterior knee throbbing pain     DIAGNOSTIC IMAGING     None     PROCEDURE: EUFLEXXA INJECTION TO THE RIGHT KNEE     FOR Gustavo Mcclain, THE FOLLOWING IS TRUE:    Gustavo Mcclain has a Diagnosis of osteoarthritis and has documentation that the pain interferes with functional activities. Gustavo Mcclain has documentation of failure to respond  adequately to at least 3 months of conservative therapy:which (Activity modification, home exercise, protective weight bearing, and various analgesics. NAME is unable to tolerate conservative therapy (prolonged NSAID use) because of adverse effects of NSAIDs (GI irritation, gastric/colonic irritation. Thus visco supplementation is requested as an alternative treatment for knee OA. Injection done by: Lemuel Starr PA-C     Gustavoyanely Mcclain , is here for the  RIGHT KNEE 1ST Euflexxa injection into right knee. Gustavo Mcclain denies any redness, fevers, shakes, chills, or any reaction from the prior Euflexxa injection. This patient is accompanied in the office by his  self. A formal time out was conducted by me informing Gustavo Mcclain of the risks and benefits of the infection. The the consent was completed at 4:25 pm and the Euflexxa injection  To the right knee was performed 12/19/2018 4:40 PM with sterile technique. The right knee is cleansed with alcohol and then sterilized with Betadine, the medial aspect of the right knee. right  knee was then injected with Euflexxa. he tolerated the procedure well. Band-Aids were applied. The risks of Euflexxa include bleeding, infection, and reactive synovitis. The pain assessment score PRIOR/AFTER to the injection: 4 /10 // too soon at determine due to this immediate injection    /10 pain severity, mild intensity, dull Pain quality. Please see above section of this report.

## 2018-12-19 NOTE — PROGRESS NOTES
1. Have you been to the ER, urgent care clinic since your last visit? Hospitalized since your last visit? No    2. Have you seen or consulted any other health care providers outside of the [address] [place] [address] since your last visit? Include any pap smears or colon screening.  No

## 2019-01-29 ENCOUNTER — OFFICE VISIT (OUTPATIENT)
Dept: ORTHOPEDIC SURGERY | Age: 59
End: 2019-01-29

## 2019-01-29 VITALS
DIASTOLIC BLOOD PRESSURE: 75 MMHG | WEIGHT: 232 LBS | OXYGEN SATURATION: 97 % | HEIGHT: 67 IN | RESPIRATION RATE: 16 BRPM | BODY MASS INDEX: 36.41 KG/M2 | TEMPERATURE: 96.9 F | SYSTOLIC BLOOD PRESSURE: 120 MMHG | HEART RATE: 55 BPM

## 2019-01-29 DIAGNOSIS — M17.11 PRIMARY OSTEOARTHRITIS OF RIGHT KNEE: Primary | ICD-10-CM

## 2019-01-29 DIAGNOSIS — M22.2X1 PATELLOFEMORAL PAIN SYNDROME OF RIGHT KNEE: ICD-10-CM

## 2019-01-29 NOTE — PROGRESS NOTES
1. Have you been to the ER, urgent care clinic since your last visit? Hospitalized since your last visit? No    2. Have you seen or consulted any other health care providers outside of the [address] [place] [address] since your last visit? Include any pap smears or colon screening.  No

## 2019-01-29 NOTE — PROGRESS NOTES
AMBULATORY PROGRESS NOTE      Patient: Shirin Roldan             MRN: [id number]     SSN: [id number] Body mass index is 36.34 kg/m². YOB: 1960     AGE: 66 y.o. EX: male    PCP: Lashanda Chicas MD     IMPRESSION/DIAGNOSIS AND TREATMENT PLAN     DIAGNOSES  1. Primary osteoarthritis of right knee    2. Patellofemoral pain syndrome of right knee        No orders of the defined types were placed in this encounter. Shirin Roldan understands his diagnoses and the proposed plan. This gentleman has OA of the medial compartment of his knee as well as patellofemoral joint articulation of the right knee. He has had advanced imaging scans in the past, I believe an MRI, which confirms a medial compartment OA as well as patellofemoral away. Because he has tried numerous anti-inflammatories, cortisone injections, Euflexxa, viscosupplementation and is still having discomfort, I am going to defer additional treatment to Dr. Sara Bejarano respectfully for this individual.      Plan:    1) Referral to Dr. Charlynn Lombard. 2) Continue activity modification as directed. RTO - PRN // Follow up with Dr. Charlynn Lombard. HPI AND EXAMINATION     Meera Larry IS A 66 y.o. male who presents to my outpatient office for follow up of primary osteoarthritis of the right knee and patellofemoral pain syndrome of the right knee. At the last visit, Ninfa Barillas PA-C, provided Euflexxa injection #3 of 3 to the right knee, instructed the patient to continue activity modification as directed, and to use cryotherapy as directed after the injection. Since we saw him last, Mr. Alec Mcmanus states that he is still experiencing pain in his right knee. He reports that while the Euflexxa injections did bring his pain level down some, the pain is not completely gone. He notes that he still has to go down sideways down stairs and that he has pain in his knee after standing on his feet 2-3 hours.  He adds that he cannot have his right leg straight while in bed and that he must bend it in order to prevent additional pain. MrShira Rodriguez describes the pain as shooting and states that it originates in his knee and radiates down his calf and up his leg. He reports that he does frequently hear and feel popping in his knee. The patient recalls that he has had multiple Cortisone injections in the past. He states that the first injection helped for some time. However, the second injection hardly lasted a week. The patient states that with the Euflexxa, he had pain relief for a few days, but after a week, most of his pain returned. The patient drives for a living. Visit Vitals  /75   Pulse (!) 55   Temp 96.9 °F (36.1 °C) (Oral)   Resp 16   Ht 5' 7\" (1.702 m)   Wt 232 lb (105.2 kg)   SpO2 97%   BMI 36.34 kg/m²     Appearance: Alert, well appearing and pleasant patient who is in no distress, oriented to person, place/time, and who follows commands. This patient is accompanied in the office by his  self. Psychiatric: Affect and mood are appropriate.    Cardiovascular/Peripheral Vascular: Normal Pulses to each hand and foot      Knees:  Right                         Gait: normal                          Cutaneous: Skin intact, no abrasions, blisters, wounds, erythema                        Effusion: Is not present                        Crepitus:  mild PF joint crepitus                        Tenderness: Medial joint line and Lateral joint line                         Alignment of Knee: mild varus when standing                        ROM: full range with pain                        Fullness or swelling: None to popliteal fossa region                        Stability: No instability to anterior, posterior, varus, valgus stress testing                        Trust: No varus trust with gait                        Contractures: No Achilles or Gastrocnemius Contractures.                         Calf tenderness: Absent for calf or gastrocnemius muscle regions                       Soft, supple, non tender, non taut lower extremity compartments  Extremities:   No embolic phenomena to the toes                           No significant edema to the foot and or toes.                         NKPLB is not present to distal 1/3 tib/fib or ankle regions.                         Lower extremities are warm and appear well perfused                          DVT: No evidence of DVT seen on examination at this time                          No calf swelling, no tenderness to calf muscles  Lymphatic:  No Evidence of Lymphedema  Vascular: Medial Border of Tibia Region: Edema is not present                        Pulses: Dorsalis Pedis &  Posterior Tibial Pulses : Palpable yes                         Varicosities Lower Limbs: None noted . Neuro: Negative bilateral Straight leg raise (seated position)                      See Musculoskeletal section for pertinent individual extremity examination                       No abnormal hand/wrist, foot/ankle, or facial/neck tremors.                       Extensor mechanism is intact    CHART REVIEW     Past Medical History:   Diagnosis Date    BPH (benign prostatic hypertrophy)     GERD (gastroesophageal reflux disease)     Herniated nucleus pulposus     High cholesterol     Hypertension      Current Outpatient Medications   Medication Sig    meloxicam (MOBIC) 15 mg tablet Take 1 Tab by mouth daily (with breakfast).  naproxen sodium (ALEVE) 220 mg tablet Take 220 mg by mouth two (2) times daily (with meals).  tamsulosin (FLOMAX) 0.4 mg capsule Take 1 Cap by mouth daily (after dinner).  ibuprofen (MOTRIN) 800 mg tablet Take  by mouth.  atorvastatin (LIPITOR) 40 mg tablet Take  by mouth daily. No current facility-administered medications for this visit.       No Known Allergies  Past Surgical History:   Procedure Laterality Date    HX COLONOSCOPY       Social History     Occupational History    Not on file Tobacco Use    Smoking status: Never Smoker    Smokeless tobacco: Never Used   Substance and Sexual Activity    Alcohol use: Yes     Alcohol/week: 12.6 oz     Types: 7 Cans of beer, 14 Shots of liquor per week    Drug use: No    Sexual activity: Yes     Partners: Female     Birth control/protection: None     Family History   Problem Relation Age of Onset    Hypertension Father     Stroke Mother     Cancer Mother         REVIEW OF SYSTEMS : 1/29/2019  ALL BELOW ARE Negative except : SEE HPI     Constitutional: Negative for fever, chills and weight loss. Neg Weight Loss  Cardiovascular: Negative for chest pain, claudication and leg swelling. SOB, MARTINEZ   Gastrointestinal/Urological: Negative for  pain, N/V/D/C, Blood in stool or urine,dysuria                         Hematuria, Incontinence, pelvic pain  Musculoskeletal: see HPI. Neurological: Negative for dizziness and weakness, headaches,Visual Changes             Confusion,  Or Seizures,   Psychiatric/Behavioral: Negative for depression, memory loss and substance abuse. Extremities:  Negative for hair changes, rash or skin lesion changes. Hematologic: Negative for Bleeding problems, bruising, pallor or swollen lymph nodes. Peripheral Vascular: No calf pain, vascular vein tenderness to calf pain              No calf throbbing, posterior knee throbbing pain     DIAGNOSTIC IMAGING     No notes on file    Please see above section of this report. I have personally reviewed the results of the above study. The interpretation of this study is my professional opinion. Written by Oneyda Doe, as dictated by Dr. Jarrell Hugo. I, Dr. Jarrell Hugo, confirm that all documentation is accurate.

## 2019-01-29 NOTE — PATIENT INSTRUCTIONS
Please follow up with Dr. Brianna Linn. You are advised to contact us if your condition worsens. Knee Arthritis: Care Instructions  Your Care Instructions    Knee arthritis is a breakdown of the cartilage that cushions your knee joint. When the cartilage wears down, your bones rub against each other. This causes pain and stiffness. Knee arthritis tends to get worse with time. Treatment for knee arthritis involves reducing pain, making the leg muscles stronger, and staying at a healthy body weight. The treatment usually does not improve the health of the cartilage, but it can reduce pain and improve how well your knee works. You can take simple measures to protect your knee joints, ease your pain, and help you stay active. Follow-up care is a key part of your treatment and safety. Be sure to make and go to all appointments, and call your doctor if you are having problems. It's also a good idea to know your test results and keep a list of the medicines you take. How can you care for yourself at home? · Know that knee arthritis will cause more pain on some days than on others. · Stay at a healthy weight. Lose weight if you are overweight. When you stand up, the pressure on your knees from every pound of body weight is multiplied four times. So if you lose 10 pounds, you will reduce the pressure on your knees by 40 pounds. · Talk to your doctor or physical therapist about exercises that will help ease joint pain. ? Stretch to help prevent stiffness and to prevent injury before you exercise. You may enjoy gentle forms of yoga to help keep your knee joints and muscles flexible. ? Walk instead of jog.  ? Ride a bike. This makes your thigh muscles stronger and takes pressure off your knee. ? Wear well-fitting and comfortable shoes. ? Exercise in chest-deep water. This can help you exercise longer with less pain. ? Avoid exercises that include squatting or kneeling.  They can put a lot of strain on your knees.  ? Talk to your doctor to make sure that the exercise you do is not making the arthritis worse. · Do not sit for long periods of time. Try to walk once in a while to keep your knee from getting stiff. · Ask your doctor or physical therapist whether shoe inserts may reduce your arthritis pain. · If you can afford it, get new athletic shoes at least every year. This can help reduce the strain on your knees. · Use a device to help you do everyday activities. ? A cane or walking stick can help you keep your balance when you walk. Hold the cane or walking stick in the hand opposite the painful knee. ? If you feel like you may fall when you walk, try using crutches or a front-wheeled walker. These can prevent falls that could cause more damage to your knee. ? A knee brace may help keep your knee stable and prevent pain. ? You also can use other things to make life easier, such as a higher toilet seat and handrails in the bathtub or shower. · Take pain medicines exactly as directed. ? Do not wait until you are in severe pain. You will get better results if you take it sooner. ? If you are not taking a prescription pain medicine, take an over-the-counter medicine such as acetaminophen (Tylenol), ibuprofen (Advil, Motrin), or naproxen (Aleve). Read and follow all instructions on the label. ? Do not take two or more pain medicines at the same time unless the doctor told you to. Many pain medicines have acetaminophen, which is Tylenol. Too much acetaminophen (Tylenol) can be harmful. ? Tell your doctor if you take a blood thinner, have diabetes, or have allergies to shellfish. · Ask your doctor if you might benefit from a shot of steroid medicine into your knee. This may provide pain relief for several months. · Many people take the supplements glucosamine and chondroitin for osteoarthritis. Some people feel they help, but the medical research does not show that they work.  Talk to your doctor before you take these supplements. When should you call for help? Call your doctor now or seek immediate medical care if:    · You have sudden swelling, warmth, or pain in your knee.     · You have knee pain and a fever or rash.     · You have such bad pain that you cannot use your knee.    Watch closely for changes in your health, and be sure to contact your doctor if you have any problems. Where can you learn more? Go to http://candelario-irineo.info/. Enter P664 in the search box to learn more about \"Knee Arthritis: Care Instructions. \"  Current as of: Danii 10, 2018  Content Version: 11.9  © 8622-7975 [employer]. Care instructions adapted under license by [facility] [employer] [facility] (which disclaims liability or warranty for this information). If you have questions about a medical condition or this instruction, always ask your healthcare professional. [address] any warranty or liability for your use of this information.

## 2019-04-15 ENCOUNTER — APPOINTMENT (OUTPATIENT)
Dept: GENERAL RADIOLOGY | Age: 59
End: 2019-04-15
Attending: PHYSICIAN ASSISTANT
Payer: COMMERCIAL

## 2019-04-15 ENCOUNTER — HOSPITAL ENCOUNTER (EMERGENCY)
Age: 59
Discharge: HOME | End: 2019-04-16
Attending: EMERGENCY MEDICINE
Payer: COMMERCIAL

## 2019-04-15 DIAGNOSIS — G89.18 POST-OP PAIN: Primary | ICD-10-CM

## 2019-04-15 DIAGNOSIS — M25.561 ACUTE PAIN OF RIGHT KNEE: ICD-10-CM

## 2019-04-15 LAB
ALBUMIN SERPL-MCNC: 2.8 G/DL (ref 3.4–5)
ALBUMIN/GLOB SERPL: 0.7 {RATIO} (ref 0.8–1.7)
ALP SERPL-CCNC: 67 U/L (ref 45–117)
ALT SERPL-CCNC: 22 U/L (ref 16–61)
ANION GAP SERPL CALC-SCNC: 8 MMOL/L (ref 3–18)
AST SERPL-CCNC: 14 U/L (ref 15–37)
BASOPHILS # BLD: 0 K/UL (ref 0–0.1)
BASOPHILS NFR BLD: 0 % (ref 0–2)
BILIRUB SERPL-MCNC: 0.6 MG/DL (ref 0.2–1)
BUN SERPL-MCNC: 13 MG/DL (ref 7–18)
BUN/CREAT SERPL: 12 (ref 12–20)
CALCIUM SERPL-MCNC: 8.9 MG/DL (ref 8.5–10.1)
CHLORIDE SERPL-SCNC: 100 MMOL/L (ref 100–108)
CO2 SERPL-SCNC: 28 MMOL/L (ref 21–32)
CREAT SERPL-MCNC: 1.12 MG/DL (ref 0.6–1.3)
DIFFERENTIAL METHOD BLD: ABNORMAL
EOSINOPHIL # BLD: 0.3 K/UL (ref 0–0.4)
EOSINOPHIL NFR BLD: 3 % (ref 0–5)
ERYTHROCYTE [DISTWIDTH] IN BLOOD BY AUTOMATED COUNT: 12.5 % (ref 11.6–14.5)
GLOBULIN SER CALC-MCNC: 3.8 G/DL (ref 2–4)
GLUCOSE SERPL-MCNC: 105 MG/DL (ref 74–99)
HCT VFR BLD AUTO: 30.6 % (ref 36–48)
HGB BLD-MCNC: 10.6 G/DL (ref 13–16)
LACTATE BLD-SCNC: 0.87 MMOL/L (ref 0.4–2)
LYMPHOCYTES # BLD: 1.5 K/UL (ref 0.9–3.6)
LYMPHOCYTES NFR BLD: 15 % (ref 21–52)
MCH RBC QN AUTO: 31.9 PG (ref 24–34)
MCHC RBC AUTO-ENTMCNC: 34.6 G/DL (ref 31–37)
MCV RBC AUTO: 92.2 FL (ref 74–97)
MONOCYTES # BLD: 1.2 K/UL (ref 0.05–1.2)
MONOCYTES NFR BLD: 12 % (ref 3–10)
NEUTS SEG # BLD: 7 K/UL (ref 1.8–8)
NEUTS SEG NFR BLD: 70 % (ref 40–73)
PLATELET # BLD AUTO: 270 K/UL (ref 135–420)
PMV BLD AUTO: 9.2 FL (ref 9.2–11.8)
POTASSIUM SERPL-SCNC: 3.7 MMOL/L (ref 3.5–5.5)
PROT SERPL-MCNC: 6.6 G/DL (ref 6.4–8.2)
RBC # BLD AUTO: 3.32 M/UL (ref 4.7–5.5)
SODIUM SERPL-SCNC: 136 MMOL/L (ref 136–145)
WBC # BLD AUTO: 9.9 K/UL (ref 4.6–13.2)

## 2019-04-15 PROCEDURE — 87040 BLOOD CULTURE FOR BACTERIA: CPT

## 2019-04-15 PROCEDURE — 99284 EMERGENCY DEPT VISIT MOD MDM: CPT

## 2019-04-15 PROCEDURE — 96374 THER/PROPH/DIAG INJ IV PUSH: CPT

## 2019-04-15 PROCEDURE — 73562 X-RAY EXAM OF KNEE 3: CPT

## 2019-04-15 PROCEDURE — 85652 RBC SED RATE AUTOMATED: CPT

## 2019-04-15 PROCEDURE — 83605 ASSAY OF LACTIC ACID: CPT

## 2019-04-15 PROCEDURE — 85025 COMPLETE CBC W/AUTO DIFF WBC: CPT

## 2019-04-15 PROCEDURE — 74011250636 HC RX REV CODE- 250/636: Performed by: PHYSICIAN ASSISTANT

## 2019-04-15 PROCEDURE — 96375 TX/PRO/DX INJ NEW DRUG ADDON: CPT

## 2019-04-15 PROCEDURE — 80053 COMPREHEN METABOLIC PANEL: CPT

## 2019-04-15 RX ORDER — ONDANSETRON 2 MG/ML
4 INJECTION INTRAMUSCULAR; INTRAVENOUS
Status: COMPLETED | OUTPATIENT
Start: 2019-04-15 | End: 2019-04-15

## 2019-04-15 RX ORDER — MORPHINE SULFATE 4 MG/ML
4 INJECTION INTRAVENOUS
Status: COMPLETED | OUTPATIENT
Start: 2019-04-15 | End: 2019-04-15

## 2019-04-15 RX ADMIN — ONDANSETRON 4 MG: 2 INJECTION INTRAMUSCULAR; INTRAVENOUS at 23:21

## 2019-04-15 RX ADMIN — MORPHINE SULFATE 4 MG: 4 INJECTION INTRAVENOUS at 23:21

## 2019-04-16 VITALS
RESPIRATION RATE: 16 BRPM | HEART RATE: 80 BPM | OXYGEN SATURATION: 96 % | DIASTOLIC BLOOD PRESSURE: 76 MMHG | SYSTOLIC BLOOD PRESSURE: 129 MMHG | TEMPERATURE: 98.7 F

## 2019-04-16 LAB — ERYTHROCYTE [SEDIMENTATION RATE] IN BLOOD: 80 MM/HR (ref 0–20)

## 2019-04-16 PROCEDURE — 96375 TX/PRO/DX INJ NEW DRUG ADDON: CPT

## 2019-04-16 PROCEDURE — 96376 TX/PRO/DX INJ SAME DRUG ADON: CPT

## 2019-04-16 PROCEDURE — 74011250636 HC RX REV CODE- 250/636: Performed by: PHYSICIAN ASSISTANT

## 2019-04-16 RX ORDER — MORPHINE SULFATE 4 MG/ML
4 INJECTION INTRAVENOUS
Status: COMPLETED | OUTPATIENT
Start: 2019-04-16 | End: 2019-04-16

## 2019-04-16 RX ORDER — MORPHINE SULFATE 4 MG/ML
4 INJECTION INTRAVENOUS
Status: DISCONTINUED | OUTPATIENT
Start: 2019-04-16 | End: 2019-04-16

## 2019-04-16 RX ORDER — KETOROLAC TROMETHAMINE 30 MG/ML
30 INJECTION, SOLUTION INTRAMUSCULAR; INTRAVENOUS
Status: COMPLETED | OUTPATIENT
Start: 2019-04-16 | End: 2019-04-16

## 2019-04-16 RX ORDER — HYDROMORPHONE HYDROCHLORIDE 1 MG/ML
1 INJECTION, SOLUTION INTRAMUSCULAR; INTRAVENOUS; SUBCUTANEOUS ONCE
Status: COMPLETED | OUTPATIENT
Start: 2019-04-16 | End: 2019-04-16

## 2019-04-16 RX ADMIN — MORPHINE SULFATE 4 MG: 4 INJECTION INTRAVENOUS at 00:50

## 2019-04-16 RX ADMIN — HYDROMORPHONE HYDROCHLORIDE 1 MG: 1 INJECTION, SOLUTION INTRAMUSCULAR; INTRAVENOUS; SUBCUTANEOUS at 02:28

## 2019-04-16 RX ADMIN — KETOROLAC TROMETHAMINE 30 MG: 30 INJECTION, SOLUTION INTRAMUSCULAR; INTRAVENOUS at 02:28

## 2019-04-16 NOTE — ED TRIAGE NOTES
Patient A/O x 4, brought into ED with complaint of right knee pain yesterday. Patient had total knee replacement x Thursday 4/11/19 at [facility]. Patient's spouse states he was discharged from hospital yesterday and has not been able to control his pain with percocet medication prescribed to him. Patient is shivering in pain. Patient's spouse states when patient stands up, his foot turns purple. Patient takes daily diose of lovenox also.

## 2019-04-22 LAB
BACTERIA SPEC CULT: NORMAL
BACTERIA SPEC CULT: NORMAL
SERVICE CMNT-IMP: NORMAL
SERVICE CMNT-IMP: NORMAL

## 2019-10-09 ENCOUNTER — OFFICE VISIT (OUTPATIENT)
Dept: SURGERY | Age: 59
End: 2019-10-09

## 2019-10-09 ENCOUNTER — HOSPITAL ENCOUNTER (OUTPATIENT)
Dept: LAB | Age: 59
Discharge: HOME | End: 2019-10-09
Payer: COMMERCIAL

## 2019-10-09 VITALS
SYSTOLIC BLOOD PRESSURE: 124 MMHG | BODY MASS INDEX: 36.41 KG/M2 | TEMPERATURE: 97.3 F | WEIGHT: 232 LBS | OXYGEN SATURATION: 98 % | HEART RATE: 63 BPM | RESPIRATION RATE: 18 BRPM | DIASTOLIC BLOOD PRESSURE: 81 MMHG | HEIGHT: 67 IN

## 2019-10-09 DIAGNOSIS — K42.9 UMBILICAL HERNIA WITHOUT OBSTRUCTION AND WITHOUT GANGRENE: ICD-10-CM

## 2019-10-09 DIAGNOSIS — K42.9 UMBILICAL HERNIA WITHOUT OBSTRUCTION AND WITHOUT GANGRENE: Primary | ICD-10-CM

## 2019-10-09 PROCEDURE — 93005 ELECTROCARDIOGRAM TRACING: CPT

## 2019-10-09 RX ORDER — DOCUSATE SODIUM 100 MG/1
100 CAPSULE, LIQUID FILLED ORAL 2 TIMES DAILY
Status: ON HOLD | COMMUNITY
End: 2019-11-04 | Stop reason: SDUPTHER

## 2019-10-09 RX ORDER — LANOLIN ALCOHOL/MO/W.PET/CERES
400 CREAM (GRAM) TOPICAL DAILY
COMMUNITY

## 2019-10-09 NOTE — PROGRESS NOTES
Review of Systems   Constitutional: Negative. HENT: Positive for hearing loss. Negative for congestion, ear discharge, ear pain, nosebleeds, sinus pain, sore throat and tinnitus. Eyes: Negative. Respiratory: Positive for shortness of breath. Negative for cough, hemoptysis, sputum production, wheezing and stridor. Cardiovascular: Positive for leg swelling. Negative for chest pain, palpitations, orthopnea, claudication and PND. Right leg after surgery   Gastrointestinal: Positive for abdominal pain, constipation and heartburn. Negative for blood in stool, diarrhea, melena, nausea and vomiting. Genitourinary: Negative. Musculoskeletal: Negative. Skin: Negative. Neurological: Negative. Endo/Heme/Allergies: Negative for environmental allergies and polydipsia. Bruises/bleeds easily. Psychiatric/Behavioral: Negative. Jerry Poe presents today for   Chief Complaint   Patient presents with    Umbilical Hernia     x 2-3 years, when it bulges it is painful       Is someone accompanying this pt? no    Is the patient using any DME equipment during OV? no    Coordination of Care:  1. Have you been to the ER, urgent care clinic since your last visit? Hospitalized since your last visit? no    2. Have you seen or consulted any other health care providers outside of the [address] [place] [address] since your last visit? Include any pap smears or colon screening.  no

## 2019-10-09 NOTE — PROGRESS NOTES
[facility]  General Surgery    Subjective:      HPI: Patient is a very pleasant 19-year-old male with a past medical history remarkable for severe obesity with BMI 36.34 kg/m², hypertension, hypercholesterolemia, BPH, nocturia, inflammatory bowel disease and gastroesophageal reflux disease. He is referred by Dr. Ruperto De Leon for evaluation and management of an umbilical hernia. Patient states that the hernia has been present for couple of years. He now experiences intermittent pain 4 out of 10. He is also having early satiety. Denies any nausea vomiting. Patient Active Problem List    Diagnosis Date Noted    Severe obesity ([address] 10/22/2018    Elevated PSA 04/11/2014    Nocturia 04/11/2014    Family history of prostate cancer 04/11/2014    Pelvic pain 04/11/2014    Prostatitis 04/11/2014    Hypercholesterolemia 04/09/2014     Past Medical History:   Diagnosis Date    BPH (benign prostatic hypertrophy)     GERD (gastroesophageal reflux disease)     Herniated nucleus pulposus     High cholesterol     Hypertension     Inflammatory bowel disease       Past Surgical History:   Procedure Laterality Date    HX COLONOSCOPY      HX KNEE REPLACEMENT Right 06/2019    HX ORTHOPAEDIC      HX VASECTOMY        Family History   Problem Relation Age of Onset    Hypertension Father     Stroke Mother     Cancer Mother       Social History     Tobacco Use    Smoking status: Never Smoker    Smokeless tobacco: Never Used   Substance Use Topics    Alcohol use: Yes     Alcohol/week: 21.0 standard drinks     Types: 7 Cans of beer, 14 Shots of liquor per week      No Known Allergies    Prior to Admission medications    Medication Sig Start Date End Date Taking? Authorizing Provider   magnesium oxide (MAG-OX) 400 mg tablet Take 400 mg by mouth daily. Yes Provider, Historical   docusate sodium (COLACE) 100 mg capsule Take 100 mg by mouth two (2) times a day.    Yes Provider, Historical   omeprazole (PRILOSEC) 20 mg capsule Take 20 mg by mouth. Yes Provider, Historical   tamsulosin (FLOMAX) 0.4 mg capsule Take 1 Cap by mouth daily (after dinner). 5/14/14  Yes Jessie Zaidi MD   atorvastatin (LIPITOR) 40 mg tablet Take 10 mg by mouth daily. Yes Provider, Historical   naproxen sodium (ALEVE) 220 mg tablet Take 220 mg by mouth two (2) times daily (with meals). Provider, Historical   ibuprofen (MOTRIN) 800 mg tablet Take  by mouth. Provider, Historical       Review of Systems:    14 systems were reviewed. The results are as above in the HPI and otherwise negative. Objective:     Vitals:    10/09/19 1015   BP: 124/81   Pulse: 63   Resp: 18   Temp: 97.3 °F (36.3 °C)   TempSrc: Oral   SpO2: 98%   Weight: 105.2 kg (232 lb)   Height: 5' 7\" (1.702 m)       Physical Exam:  GENERAL: alert, cooperative, no distress, appears stated age,   EYE: conjunctivae/corneas clear. PERRL, EOM's intact. THROAT & NECK: normal and no erythema or exudates noted. ,    LYMPHATIC: Cervical, supraclavicular, and axillary nodes normal. ,   LUNG: clear to auscultation bilaterally,   HEART: regular rate and rhythm, S1, S2 normal, no murmur, click, rub or gallop,   ABDOMEN: soft, non-tender. No chronically incarcerated umbilical hernia which is nonreducible. Bowel sounds normal. No masses,  no organomegaly,   EXTREMITIES:  extremities normal, atraumatic, no cyanosis or edema,   SKIN: Normal.,   NEUROLOGIC: AOx3. Cranial nerves 2-12 and sensation grossly intact. ,     Data Review:  to be done    Mr. Earl Gallowya has a reminder for a \"due or due soon\" health maintenance. I have asked that he contact his primary care provider for follow-up on this health maintenance. Impression:     Patient with a chronically incarcerated nonreducible umbilical hernia which is painful and symptomatic. Plan:     · Robot assisted laparoscopic umbilical hernia repair with mesh.   · Consent on chart  · Preoperative orders written    Signed By: Ozell Dance Danielle Beth MD     October 9, 2019

## 2019-10-09 NOTE — H&P (VIEW-ONLY)
[facility] General Surgery Subjective: HPI: Patient is a very pleasant 82-year-old male with a past medical history remarkable for severe obesity with BMI 36.34 kg/m², hypertension, hypercholesterolemia, BPH, nocturia, inflammatory bowel disease and gastroesophageal reflux disease. He is referred by Dr. Katty Carson for evaluation and management of an umbilical hernia. Patient states that the hernia has been present for couple of years. He now experiences intermittent pain 4 out of 10. He is also having early satiety. Denies any nausea vomiting. Patient Active Problem List  
 Diagnosis Date Noted  Severe obesity ([facility] [address] 10/22/2018  Elevated PSA 04/11/2014  Nocturia 04/11/2014  Family history of prostate cancer 04/11/2014  Pelvic pain 04/11/2014  Prostatitis 04/11/2014  Hypercholesterolemia 04/09/2014 Past Medical History:  
Diagnosis Date  BPH (benign prostatic hypertrophy)  GERD (gastroesophageal reflux disease)  Herniated nucleus pulposus  High cholesterol  Hypertension  Inflammatory bowel disease Past Surgical History:  
Procedure Laterality Date  HX COLONOSCOPY    
 HX KNEE REPLACEMENT Right 06/2019  HX ORTHOPAEDIC    
 HX VASECTOMY Family History Problem Relation Age of Onset  Hypertension Father  Stroke Mother  Cancer Mother Social History Tobacco Use  Smoking status: Never Smoker  Smokeless tobacco: Never Used Substance Use Topics  Alcohol use: Yes Alcohol/week: 21.0 standard drinks Types: 7 Cans of beer, 14 Shots of liquor per week No Known Allergies Prior to Admission medications Medication Sig Start Date End Date Taking? Authorizing Provider  
magnesium oxide (MAG-OX) 400 mg tablet Take 400 mg by mouth daily. Yes Provider, Historical  
docusate sodium (COLACE) 100 mg capsule Take 100 mg by mouth two (2) times a day.    Yes Provider, Historical  
 omeprazole (PRILOSEC) 20 mg capsule Take 20 mg by mouth. Yes Provider, Historical  
tamsulosin (FLOMAX) 0.4 mg capsule Take 1 Cap by mouth daily (after dinner). 5/14/14  Yes Alfonso Archibald MD  
atorvastatin (LIPITOR) 40 mg tablet Take 10 mg by mouth daily. Yes Provider, Historical  
naproxen sodium (ALEVE) 220 mg tablet Take 220 mg by mouth two (2) times daily (with meals). Provider, Historical  
ibuprofen (MOTRIN) 800 mg tablet Take  by mouth. Provider, Historical  
 
 
Review of Systems:   
14 systems were reviewed. The results are as above in the HPI and otherwise negative. Objective:  
 
Vitals:  
 10/09/19 1015 BP: 124/81 Pulse: 63 Resp: 18 Temp: 97.3 °F (36.3 °C) TempSrc: Oral  
SpO2: 98% Weight: 105.2 kg (232 lb) Height: 5' 7\" (1.702 m) Physical Exam: 
GENERAL: alert, cooperative, no distress, appears stated age, EYE: conjunctivae/corneas clear. PERRL, EOM's intact. THROAT & NECK: normal and no erythema or exudates noted. ,   
LYMPHATIC: Cervical, supraclavicular, and axillary nodes normal. ,  
LUNG: clear to auscultation bilaterally, HEART: regular rate and rhythm, S1, S2 normal, no murmur, click, rub or gallop, ABDOMEN: soft, non-tender. No chronically incarcerated umbilical hernia which is nonreducible. Bowel sounds normal. No masses,  no organomegaly, EXTREMITIES:  extremities normal, atraumatic, no cyanosis or edema, SKIN: Normal., NEUROLOGIC: AOx3. Cranial nerves 2-12 and sensation grossly intact. ,  
 
Data Review:  to be done Mr. Feng Miguel has a reminder for a \"due or due soon\" health maintenance. I have asked that he contact his primary care provider for follow-up on this health maintenance. Impression:  
 
Patient with a chronically incarcerated nonreducible umbilical hernia which is painful and symptomatic. Plan:  
 
· Robot assisted laparoscopic umbilical hernia repair with mesh. · Consent on chart · Preoperative orders written Signed By: Neno Walker MD   
 October 9, 2019

## 2019-10-10 LAB
ATRIAL RATE: 51 BPM
CALCULATED P AXIS, ECG09: 45 DEGREES
CALCULATED R AXIS, ECG10: 8 DEGREES
CALCULATED T AXIS, ECG11: 13 DEGREES
DIAGNOSIS, 93000: NORMAL
P-R INTERVAL, ECG05: 166 MS
Q-T INTERVAL, ECG07: 440 MS
QRS DURATION, ECG06: 104 MS
QTC CALCULATION (BEZET), ECG08: 405 MS
VENTRICULAR RATE, ECG03: 51 BPM

## 2019-10-22 ENCOUNTER — HOSPITAL ENCOUNTER (OUTPATIENT)
Dept: LAB | Age: 59
Discharge: HOME | End: 2019-10-22

## 2019-10-22 ENCOUNTER — HOSPITAL ENCOUNTER (OUTPATIENT)
Dept: GENERAL RADIOLOGY | Age: 59
Discharge: HOME | End: 2019-10-22
Payer: COMMERCIAL

## 2019-10-22 DIAGNOSIS — K42.9 UMBILICAL HERNIA WITHOUT OBSTRUCTION AND WITHOUT GANGRENE: ICD-10-CM

## 2019-10-22 LAB — XX-LABCORP SPECIMEN COL,LCBCF: NORMAL

## 2019-10-22 PROCEDURE — 71046 X-RAY EXAM CHEST 2 VIEWS: CPT

## 2019-10-22 PROCEDURE — 99001 SPECIMEN HANDLING PT-LAB: CPT

## 2019-10-22 NOTE — PATIENT INSTRUCTIONS
Abdominal Hernia Repair: What to Expect at Home  Your Recovery  After surgery to repair your hernia, you are likely to have pain for a few days. You may also feel like you have the flu, and you may have a low fever and feel tired and nauseated. This is common. You should feel better after a few days and will probably feel much better in 7 days. For several weeks you may feel twinges or pulling in the hernia repair when you move. You may have some bruising around the area of your hernia repair. This is normal.  This care sheet gives you a general idea about how long it will take for you to recover. But each person recovers at a different pace. Follow the steps below to get better as quickly as possible. How can you care for yourself at home? Activity    · Rest when you feel tired. Getting enough sleep will help you recover.     · Try to walk each day. Start by walking a little more than you did the day before. Bit by bit, increase the amount you walk. Walking boosts blood flow and helps prevent pneumonia and constipation.     · If your doctor gives you an abdominal binder to wear, use it as directed. This is an elastic bandage that wraps around your belly and upper hips. It helps support your belly muscles after surgery.     · Avoid strenuous activities, such as biking, jogging, weight lifting, or aerobic exercise, until your doctor says it is okay.     · Avoid lifting anything that would make you strain. This may include heavy grocery bags and milk containers, a heavy briefcase or backpack, cat litter or dog food bags, a vacuum , or a child.     · Ask your doctor when you can drive again.     · Most people are able to return to work within 1 to 2 weeks after surgery. But if your job requires that you to do heavy lifting or strenuous activity, you may need to take 4 to 6 weeks off from work.     · You may shower 24 to 48 hours after surgery, if your doctor okays it. Pat the cut (incision) dry.  Do not take a bath for the first 2 weeks, or until your doctor tells you it is okay.     · Ask your doctor when it is okay for you to have sex. Diet    · You can eat your normal diet. If your stomach is upset, try bland, low-fat foods like plain rice, broiled chicken, toast, and yogurt.     · Drink plenty of fluids (unless your doctor tells you not to).     · You may notice that your bowel movements are not regular right after your surgery. This is common. Avoid constipation and straining with bowel movements. You may want to take a fiber supplement every day. If you have not had a bowel movement after a couple of days, ask your doctor about taking a mild laxative. Medicines    · Your doctor will tell you if and when you can restart your medicines. He or she will also give you instructions about taking any new medicines.     · If you take blood thinners, such as warfarin (Coumadin), clopidogrel (Plavix), or aspirin, be sure to talk to your doctor. He or she will tell you if and when to start taking those medicines again. Make sure that you understand exactly what your doctor wants you to do.     · Be safe with medicines. Take pain medicines exactly as directed. ? If the doctor gave you a prescription medicine for pain, take it as prescribed. ? If you are not taking a prescription pain medicine, ask your doctor if you can take an over-the-counter medicine.     · If your doctor prescribed antibiotics, take them as directed. Do not stop taking them just because you feel better. You need to take the full course of antibiotics.     · If you think your pain medicine is making you sick to your stomach:  ? Take your medicine after meals (unless your doctor has told you not to). ? Ask your doctor for a different pain medicine. Incision care    · If you have strips of tape on the cut (incision) the doctor made, leave the tape on for a week or until it falls off. Or follow your doctor's instructions for removing the tape.   · If you have staples closing the cut, you will need to visit your doctor in 1 to 2 weeks to have them removed.     · Wash the area daily with warm, soapy water, and pat it dry. Don't use hydrogen peroxide or alcohol, which can slow healing. You may cover the area with a gauze bandage if it weeps or rubs against clothing. Change the bandage every day. Other instructions    · Hold a pillow over your incision when you cough or take deep breaths. This will support your belly and decrease your pain.     · Do breathing exercises at home as instructed by your doctor. This will help prevent pneumonia.     · If you had laparoscopic surgery, you may also have pain in your left shoulder. The pain usually lasts about a day or two. Follow-up care is a key part of your treatment and safety. Be sure to make and go to all appointments, and call your doctor if you are having problems. It's also a good idea to know your test results and keep a list of the medicines you take. When should you call for help? Call 911 anytime you think you may need emergency care. For example, call if:    · You passed out (lost consciousness).     · You are short of breath.    Call your doctor now or seek immediate medical care if:    · You are sick to your stomach and cannot drink fluids.     · You have signs of a blood clot in your leg (called a deep vein thrombosis), such as:  ? Pain in your calf, back of the knee, thigh, or groin. ? Redness and swelling in your leg or groin.     · You have signs of infection, such as:  ? Increased pain, swelling, warmth, or redness. ? Red streaks leading from the incision. ? Pus draining from the incision. ?  A fever.     · You cannot pass stools or gas.     · You have pain that does not get better after you take pain medicine.     · You have loose stitches, or your incision comes open.     · Bright red blood has soaked through the bandage over your incision.    Watch closely for changes in your health, and be sure to contact your doctor if you have any problems. Where can you learn more? Go to http://candelario-irineo.info/. Enter B577 in the search box to learn more about \"Abdominal Hernia Repair: What to Expect at Home. \"  Current as of: November 7, 2018  Content Version: 12.2  © 8783-6743 [employer], [employer]. Care instructions adapted under license by [employer] (which disclaims liability or warranty for this information). If you have questions about a medical condition or this instruction, always ask your healthcare professional. [address] any warranty or liability for your use of this information.

## 2019-10-23 LAB
BASOPHILS # BLD AUTO: 0 X10E3/UL (ref 0–0.2)
BASOPHILS NFR BLD AUTO: 0 %
BUN SERPL-MCNC: 18 MG/DL (ref 6–24)
BUN/CREAT SERPL: 15 (ref 9–20)
CALCIUM SERPL-MCNC: 9.1 MG/DL (ref 8.7–10.2)
CHLORIDE SERPL-SCNC: 105 MMOL/L (ref 96–106)
CO2 SERPL-SCNC: 23 MMOL/L (ref 20–29)
CREAT SERPL-MCNC: 1.21 MG/DL (ref 0.76–1.27)
EOSINOPHIL # BLD AUTO: 0.2 X10E3/UL (ref 0–0.4)
EOSINOPHIL NFR BLD AUTO: 2 %
ERYTHROCYTE [DISTWIDTH] IN BLOOD BY AUTOMATED COUNT: 12.2 % (ref 12.3–15.4)
GLUCOSE SERPL-MCNC: 111 MG/DL (ref 65–99)
HCT VFR BLD AUTO: 43.4 % (ref 37.5–51)
HGB BLD-MCNC: 14.8 G/DL (ref 13–17.7)
IMM GRANULOCYTES # BLD AUTO: 0.1 X10E3/UL (ref 0–0.1)
IMM GRANULOCYTES NFR BLD AUTO: 1 %
LYMPHOCYTES # BLD AUTO: 2.1 X10E3/UL (ref 0.7–3.1)
LYMPHOCYTES NFR BLD AUTO: 23 %
MCH RBC QN AUTO: 32.1 PG (ref 26.6–33)
MCHC RBC AUTO-ENTMCNC: 34.1 G/DL (ref 31.5–35.7)
MCV RBC AUTO: 94 FL (ref 79–97)
MONOCYTES # BLD AUTO: 0.9 X10E3/UL (ref 0.1–0.9)
MONOCYTES NFR BLD AUTO: 10 %
NEUTROPHILS # BLD AUTO: 5.9 X10E3/UL (ref 1.4–7)
NEUTROPHILS NFR BLD AUTO: 64 %
PLATELET # BLD AUTO: 280 X10E3/UL (ref 150–450)
POTASSIUM SERPL-SCNC: 4.2 MMOL/L (ref 3.5–5.2)
RBC # BLD AUTO: 4.61 X10E6/UL (ref 4.14–5.8)
SODIUM SERPL-SCNC: 141 MMOL/L (ref 134–144)
WBC # BLD AUTO: 9.1 X10E3/UL (ref 3.4–10.8)

## 2019-11-02 ENCOUNTER — ANESTHESIA EVENT (OUTPATIENT)
Dept: SURGERY | Age: 59
End: 2019-11-02
Payer: COMMERCIAL

## 2019-11-04 ENCOUNTER — ANESTHESIA (OUTPATIENT)
Dept: SURGERY | Age: 59
End: 2019-11-04
Payer: COMMERCIAL

## 2019-11-04 ENCOUNTER — HOSPITAL ENCOUNTER (OUTPATIENT)
Age: 59
Setting detail: OUTPATIENT SURGERY
Discharge: HOME | End: 2019-11-04
Attending: SURGERY | Admitting: SURGERY
Payer: COMMERCIAL

## 2019-11-04 VITALS
TEMPERATURE: 98.5 F | BODY MASS INDEX: 34.71 KG/M2 | RESPIRATION RATE: 20 BRPM | HEIGHT: 68 IN | WEIGHT: 229 LBS | SYSTOLIC BLOOD PRESSURE: 122 MMHG | OXYGEN SATURATION: 95 % | DIASTOLIC BLOOD PRESSURE: 81 MMHG | HEART RATE: 95 BPM

## 2019-11-04 DIAGNOSIS — G89.18 POSTOPERATIVE PAIN: Primary | ICD-10-CM

## 2019-11-04 PROCEDURE — 74011250637 HC RX REV CODE- 250/637: Performed by: SURGERY

## 2019-11-04 PROCEDURE — 77030011943: Performed by: SURGERY

## 2019-11-04 PROCEDURE — 74011250636 HC RX REV CODE- 250/636

## 2019-11-04 PROCEDURE — 51798 US URINE CAPACITY MEASURE: CPT

## 2019-11-04 PROCEDURE — 77030019605: Performed by: SURGERY

## 2019-11-04 PROCEDURE — 74011250636 HC RX REV CODE- 250/636: Performed by: SURGERY

## 2019-11-04 PROCEDURE — 77030009848 HC PASSR SUT SET COOP -C: Performed by: SURGERY

## 2019-11-04 PROCEDURE — 74011250636 HC RX REV CODE- 250/636: Performed by: ANESTHESIOLOGY

## 2019-11-04 PROCEDURE — 74011000250 HC RX REV CODE- 250: Performed by: SURGERY

## 2019-11-04 PROCEDURE — 77030040361 HC SLV COMPR DVT MDII -B: Performed by: SURGERY

## 2019-11-04 PROCEDURE — 76060000034 HC ANESTHESIA 1.5 TO 2 HR: Performed by: SURGERY

## 2019-11-04 PROCEDURE — 77030040922 HC BLNKT HYPOTHRM STRY -A: Performed by: SURGERY

## 2019-11-04 PROCEDURE — 77030018673: Performed by: SURGERY

## 2019-11-04 PROCEDURE — 74011250636 HC RX REV CODE- 250/636: Performed by: NURSE ANESTHETIST, CERTIFIED REGISTERED

## 2019-11-04 PROCEDURE — 77030003028 HC SUT VCRL J&J -A: Performed by: SURGERY

## 2019-11-04 PROCEDURE — 77030035277 HC OBTRTR BLDELSS DISP INTU -B: Performed by: SURGERY

## 2019-11-04 PROCEDURE — 77030020703 HC SEAL CANN DISP INTU -B: Performed by: SURGERY

## 2019-11-04 PROCEDURE — 77030036554: Performed by: SURGERY

## 2019-11-04 PROCEDURE — 77030019908 HC STETH ESOPH SIMS -A: Performed by: ANESTHESIOLOGY

## 2019-11-04 PROCEDURE — 74011250637 HC RX REV CODE- 250/637: Performed by: NURSE ANESTHETIST, CERTIFIED REGISTERED

## 2019-11-04 PROCEDURE — 77030008608 HC TRCR ENDOSC SMTH AMR -B: Performed by: SURGERY

## 2019-11-04 PROCEDURE — 77030002933 HC SUT MCRYL J&J -A: Performed by: SURGERY

## 2019-11-04 PROCEDURE — 76210000016 HC OR PH I REC 1 TO 1.5 HR: Performed by: SURGERY

## 2019-11-04 PROCEDURE — 77030022704 HC SUT VLOC COVD -B: Performed by: SURGERY

## 2019-11-04 PROCEDURE — 76010000875 HC OR TIME 1.5 TO 2HR INTENSV - TIER 2: Performed by: SURGERY

## 2019-11-04 PROCEDURE — 77030008683 HC TU ET CUF COVD -A: Performed by: ANESTHESIOLOGY

## 2019-11-04 PROCEDURE — 74011000250 HC RX REV CODE- 250: Performed by: NURSE ANESTHETIST, CERTIFIED REGISTERED

## 2019-11-04 PROCEDURE — 77030018836 HC SOL IRR NACL ICUM -A: Performed by: SURGERY

## 2019-11-04 PROCEDURE — 76210000031 HC REC RM PH II 4.5 TO 5 HR: Performed by: SURGERY

## 2019-11-04 PROCEDURE — 77030013079 HC BLNKT BAIR HGGR 3M -A: Performed by: ANESTHESIOLOGY

## 2019-11-04 PROCEDURE — C1781 MESH (IMPLANTABLE): HCPCS | Performed by: SURGERY

## 2019-11-04 DEVICE — VENTRALIGHT ST MESH WITH ECHO PS POSITIONING SYSTEM, 6" (15.2 CM), CIRCLE
Type: IMPLANTABLE DEVICE | Site: ABDOMEN | Status: FUNCTIONAL
Brand: VENTRALIGHT

## 2019-11-04 RX ORDER — NEOSTIGMINE METHYLSULFATE 1 MG/ML
INJECTION INTRAVENOUS AS NEEDED
Status: DISCONTINUED | OUTPATIENT
Start: 2019-11-04 | End: 2019-11-04 | Stop reason: HOSPADM

## 2019-11-04 RX ORDER — HYDRALAZINE HYDROCHLORIDE 20 MG/ML
5 INJECTION INTRAMUSCULAR; INTRAVENOUS
Status: COMPLETED | OUTPATIENT
Start: 2019-11-04 | End: 2019-11-04

## 2019-11-04 RX ORDER — BUPIVACAINE HYDROCHLORIDE 2.5 MG/ML
INJECTION, SOLUTION EPIDURAL; INFILTRATION; INTRACAUDAL AS NEEDED
Status: DISCONTINUED | OUTPATIENT
Start: 2019-11-04 | End: 2019-11-04 | Stop reason: HOSPADM

## 2019-11-04 RX ORDER — TAMSULOSIN HYDROCHLORIDE 0.4 MG/1
0.8 CAPSULE ORAL
Status: COMPLETED | OUTPATIENT
Start: 2019-11-04 | End: 2019-11-04

## 2019-11-04 RX ORDER — SODIUM CHLORIDE, SODIUM LACTATE, POTASSIUM CHLORIDE, CALCIUM CHLORIDE 600; 310; 30; 20 MG/100ML; MG/100ML; MG/100ML; MG/100ML
125 INJECTION, SOLUTION INTRAVENOUS CONTINUOUS
Status: DISCONTINUED | OUTPATIENT
Start: 2019-11-04 | End: 2019-11-04 | Stop reason: HOSPADM

## 2019-11-04 RX ORDER — DOCUSATE SODIUM 100 MG/1
100 CAPSULE, LIQUID FILLED ORAL 2 TIMES DAILY
Qty: 60 CAP | Refills: 0 | Status: SHIPPED | OUTPATIENT
Start: 2019-11-04

## 2019-11-04 RX ORDER — SUCCINYLCHOLINE CHLORIDE 20 MG/ML
INJECTION INTRAMUSCULAR; INTRAVENOUS AS NEEDED
Status: DISCONTINUED | OUTPATIENT
Start: 2019-11-04 | End: 2019-11-04 | Stop reason: HOSPADM

## 2019-11-04 RX ORDER — ONDANSETRON 2 MG/ML
4 INJECTION INTRAMUSCULAR; INTRAVENOUS ONCE
Status: COMPLETED | OUTPATIENT
Start: 2019-11-04 | End: 2019-11-04

## 2019-11-04 RX ORDER — DIPHENHYDRAMINE HYDROCHLORIDE 50 MG/ML
12.5 INJECTION, SOLUTION INTRAMUSCULAR; INTRAVENOUS
Status: DISCONTINUED | OUTPATIENT
Start: 2019-11-04 | End: 2019-11-04 | Stop reason: HOSPADM

## 2019-11-04 RX ORDER — SODIUM CHLORIDE 0.9 % (FLUSH) 0.9 %
5-40 SYRINGE (ML) INJECTION AS NEEDED
Status: DISCONTINUED | OUTPATIENT
Start: 2019-11-04 | End: 2019-11-04 | Stop reason: HOSPADM

## 2019-11-04 RX ORDER — ALBUTEROL SULFATE 0.83 MG/ML
2.5 SOLUTION RESPIRATORY (INHALATION) AS NEEDED
Status: DISCONTINUED | OUTPATIENT
Start: 2019-11-04 | End: 2019-11-04 | Stop reason: HOSPADM

## 2019-11-04 RX ORDER — KETOROLAC TROMETHAMINE 15 MG/ML
INJECTION, SOLUTION INTRAMUSCULAR; INTRAVENOUS AS NEEDED
Status: DISCONTINUED | OUTPATIENT
Start: 2019-11-04 | End: 2019-11-04 | Stop reason: HOSPADM

## 2019-11-04 RX ORDER — LIDOCAINE HYDROCHLORIDE 20 MG/ML
INJECTION, SOLUTION EPIDURAL; INFILTRATION; INTRACAUDAL; PERINEURAL AS NEEDED
Status: DISCONTINUED | OUTPATIENT
Start: 2019-11-04 | End: 2019-11-04 | Stop reason: HOSPADM

## 2019-11-04 RX ORDER — CEFAZOLIN SODIUM 2 G/50ML
2 SOLUTION INTRAVENOUS ONCE
Status: COMPLETED | OUTPATIENT
Start: 2019-11-04 | End: 2019-11-04

## 2019-11-04 RX ORDER — IBUPROFEN 800 MG/1
800 TABLET ORAL
Qty: 40 TAB | Refills: 0 | Status: SHIPPED | OUTPATIENT
Start: 2019-11-04

## 2019-11-04 RX ORDER — NALOXONE HYDROCHLORIDE 0.4 MG/ML
0.04 INJECTION, SOLUTION INTRAMUSCULAR; INTRAVENOUS; SUBCUTANEOUS AS NEEDED
Status: DISCONTINUED | OUTPATIENT
Start: 2019-11-04 | End: 2019-11-04 | Stop reason: HOSPADM

## 2019-11-04 RX ORDER — ROCURONIUM BROMIDE 10 MG/ML
INJECTION, SOLUTION INTRAVENOUS AS NEEDED
Status: DISCONTINUED | OUTPATIENT
Start: 2019-11-04 | End: 2019-11-04 | Stop reason: HOSPADM

## 2019-11-04 RX ORDER — GLYCOPYRROLATE 0.2 MG/ML
INJECTION INTRAMUSCULAR; INTRAVENOUS AS NEEDED
Status: DISCONTINUED | OUTPATIENT
Start: 2019-11-04 | End: 2019-11-04 | Stop reason: HOSPADM

## 2019-11-04 RX ORDER — PROPOFOL 10 MG/ML
INJECTION, EMULSION INTRAVENOUS AS NEEDED
Status: DISCONTINUED | OUTPATIENT
Start: 2019-11-04 | End: 2019-11-04 | Stop reason: HOSPADM

## 2019-11-04 RX ORDER — HYDROMORPHONE HYDROCHLORIDE 2 MG/ML
INJECTION, SOLUTION INTRAMUSCULAR; INTRAVENOUS; SUBCUTANEOUS AS NEEDED
Status: DISCONTINUED | OUTPATIENT
Start: 2019-11-04 | End: 2019-11-04 | Stop reason: HOSPADM

## 2019-11-04 RX ORDER — HYDRALAZINE HYDROCHLORIDE 20 MG/ML
INJECTION INTRAMUSCULAR; INTRAVENOUS
Status: COMPLETED
Start: 2019-11-04 | End: 2019-11-04

## 2019-11-04 RX ORDER — OXYCODONE AND ACETAMINOPHEN 5; 325 MG/1; MG/1
1 TABLET ORAL
Qty: 25 TAB | Refills: 0 | Status: SHIPPED | OUTPATIENT
Start: 2019-11-04 | End: 2019-11-11

## 2019-11-04 RX ORDER — MIDAZOLAM HYDROCHLORIDE 1 MG/ML
INJECTION, SOLUTION INTRAMUSCULAR; INTRAVENOUS AS NEEDED
Status: DISCONTINUED | OUTPATIENT
Start: 2019-11-04 | End: 2019-11-04 | Stop reason: HOSPADM

## 2019-11-04 RX ORDER — HYDROMORPHONE HYDROCHLORIDE 2 MG/ML
0.5 INJECTION, SOLUTION INTRAMUSCULAR; INTRAVENOUS; SUBCUTANEOUS
Status: COMPLETED | OUTPATIENT
Start: 2019-11-04 | End: 2019-11-04

## 2019-11-04 RX ORDER — OXYCODONE AND ACETAMINOPHEN 5; 325 MG/1; MG/1
1 TABLET ORAL
Status: COMPLETED | OUTPATIENT
Start: 2019-11-04 | End: 2019-11-04

## 2019-11-04 RX ORDER — SODIUM CHLORIDE 0.9 % (FLUSH) 0.9 %
5-40 SYRINGE (ML) INJECTION EVERY 8 HOURS
Status: DISCONTINUED | OUTPATIENT
Start: 2019-11-04 | End: 2019-11-04 | Stop reason: HOSPADM

## 2019-11-04 RX ORDER — FAMOTIDINE 20 MG/1
20 TABLET, FILM COATED ORAL ONCE
Status: COMPLETED | OUTPATIENT
Start: 2019-11-04 | End: 2019-11-04

## 2019-11-04 RX ORDER — SODIUM CHLORIDE, SODIUM LACTATE, POTASSIUM CHLORIDE, CALCIUM CHLORIDE 600; 310; 30; 20 MG/100ML; MG/100ML; MG/100ML; MG/100ML
50 INJECTION, SOLUTION INTRAVENOUS CONTINUOUS
Status: DISCONTINUED | OUTPATIENT
Start: 2019-11-04 | End: 2019-11-04 | Stop reason: HOSPADM

## 2019-11-04 RX ORDER — FENTANYL CITRATE 50 UG/ML
INJECTION, SOLUTION INTRAMUSCULAR; INTRAVENOUS AS NEEDED
Status: DISCONTINUED | OUTPATIENT
Start: 2019-11-04 | End: 2019-11-04 | Stop reason: HOSPADM

## 2019-11-04 RX ORDER — ONDANSETRON 2 MG/ML
INJECTION INTRAMUSCULAR; INTRAVENOUS AS NEEDED
Status: DISCONTINUED | OUTPATIENT
Start: 2019-11-04 | End: 2019-11-04 | Stop reason: HOSPADM

## 2019-11-04 RX ORDER — DEXAMETHASONE SODIUM PHOSPHATE 4 MG/ML
INJECTION, SOLUTION INTRA-ARTICULAR; INTRALESIONAL; INTRAMUSCULAR; INTRAVENOUS; SOFT TISSUE AS NEEDED
Status: DISCONTINUED | OUTPATIENT
Start: 2019-11-04 | End: 2019-11-04 | Stop reason: HOSPADM

## 2019-11-04 RX ADMIN — GLYCOPYRROLATE 0.2 MG: 0.2 INJECTION INTRAMUSCULAR; INTRAVENOUS at 07:38

## 2019-11-04 RX ADMIN — SUCCINYLCHOLINE CHLORIDE 45 MG: 20 INJECTION, SOLUTION INTRAMUSCULAR; INTRAVENOUS at 07:52

## 2019-11-04 RX ADMIN — SODIUM CHLORIDE, SODIUM LACTATE, POTASSIUM CHLORIDE, AND CALCIUM CHLORIDE: 600; 310; 30; 20 INJECTION, SOLUTION INTRAVENOUS at 08:50

## 2019-11-04 RX ADMIN — HYDROMORPHONE HYDROCHLORIDE 1 MG: 2 INJECTION, SOLUTION INTRAMUSCULAR; INTRAVENOUS; SUBCUTANEOUS at 09:35

## 2019-11-04 RX ADMIN — ROCURONIUM BROMIDE 10 MG: 10 INJECTION, SOLUTION INTRAVENOUS at 08:53

## 2019-11-04 RX ADMIN — HYDROMORPHONE HYDROCHLORIDE 0.5 MG: 2 INJECTION, SOLUTION INTRAMUSCULAR; INTRAVENOUS; SUBCUTANEOUS at 10:15

## 2019-11-04 RX ADMIN — LIDOCAINE HYDROCHLORIDE 100 MG: 20 INJECTION, SOLUTION EPIDURAL; INFILTRATION; INTRACAUDAL; PERINEURAL at 07:45

## 2019-11-04 RX ADMIN — ROCURONIUM BROMIDE 45 MG: 10 INJECTION, SOLUTION INTRAVENOUS at 07:55

## 2019-11-04 RX ADMIN — KETOROLAC TROMETHAMINE 15 MG: 15 INJECTION, SOLUTION INTRAMUSCULAR; INTRAVENOUS at 09:17

## 2019-11-04 RX ADMIN — FENTANYL CITRATE 50 MCG: 50 INJECTION, SOLUTION INTRAMUSCULAR; INTRAVENOUS at 09:15

## 2019-11-04 RX ADMIN — HYDROMORPHONE HYDROCHLORIDE 0.5 MG: 2 INJECTION, SOLUTION INTRAMUSCULAR; INTRAVENOUS; SUBCUTANEOUS at 10:30

## 2019-11-04 RX ADMIN — SODIUM CHLORIDE, SODIUM LACTATE, POTASSIUM CHLORIDE, AND CALCIUM CHLORIDE: 600; 310; 30; 20 INJECTION, SOLUTION INTRAVENOUS at 07:38

## 2019-11-04 RX ADMIN — TAMSULOSIN HYDROCHLORIDE 0.8 MG: 0.4 CAPSULE ORAL at 13:52

## 2019-11-04 RX ADMIN — HYDROMORPHONE HYDROCHLORIDE 0.5 MG: 2 INJECTION, SOLUTION INTRAMUSCULAR; INTRAVENOUS; SUBCUTANEOUS at 09:45

## 2019-11-04 RX ADMIN — HYDROMORPHONE HYDROCHLORIDE 0.5 MG: 2 INJECTION, SOLUTION INTRAMUSCULAR; INTRAVENOUS; SUBCUTANEOUS at 10:00

## 2019-11-04 RX ADMIN — GLYCOPYRROLATE 0.6 MG: 0.2 INJECTION INTRAMUSCULAR; INTRAVENOUS at 09:17

## 2019-11-04 RX ADMIN — MIDAZOLAM HYDROCHLORIDE 2 MG: 2 INJECTION, SOLUTION INTRAMUSCULAR; INTRAVENOUS at 07:38

## 2019-11-04 RX ADMIN — FENTANYL CITRATE 50 MCG: 50 INJECTION, SOLUTION INTRAMUSCULAR; INTRAVENOUS at 07:46

## 2019-11-04 RX ADMIN — DEXAMETHASONE SODIUM PHOSPHATE 4 MG: 4 INJECTION, SOLUTION INTRAMUSCULAR; INTRAVENOUS at 08:00

## 2019-11-04 RX ADMIN — ROCURONIUM BROMIDE 5 MG: 10 INJECTION, SOLUTION INTRAVENOUS at 07:45

## 2019-11-04 RX ADMIN — HYDRALAZINE HYDROCHLORIDE 5 MG: 20 INJECTION INTRAMUSCULAR; INTRAVENOUS at 10:41

## 2019-11-04 RX ADMIN — ONDANSETRON 4 MG: 2 INJECTION INTRAMUSCULAR; INTRAVENOUS at 09:45

## 2019-11-04 RX ADMIN — HYDROMORPHONE HYDROCHLORIDE 1 MG: 2 INJECTION, SOLUTION INTRAMUSCULAR; INTRAVENOUS; SUBCUTANEOUS at 09:26

## 2019-11-04 RX ADMIN — OXYCODONE HYDROCHLORIDE AND ACETAMINOPHEN 1 TABLET: 5; 325 TABLET ORAL at 11:53

## 2019-11-04 RX ADMIN — SUCCINYLCHOLINE CHLORIDE 160 MG: 20 INJECTION, SOLUTION INTRAMUSCULAR; INTRAVENOUS at 07:45

## 2019-11-04 RX ADMIN — PROPOFOL 150 MG: 10 INJECTION, EMULSION INTRAVENOUS at 07:45

## 2019-11-04 RX ADMIN — FENTANYL CITRATE 100 MCG: 50 INJECTION, SOLUTION INTRAMUSCULAR; INTRAVENOUS at 07:45

## 2019-11-04 RX ADMIN — NEOSTIGMINE METHYLSULFATE 5 MG: 1 INJECTION, SOLUTION INTRAVENOUS at 09:17

## 2019-11-04 RX ADMIN — FAMOTIDINE 20 MG: 20 TABLET, FILM COATED ORAL at 06:28

## 2019-11-04 RX ADMIN — HYDRALAZINE HYDROCHLORIDE 5 MG: 20 INJECTION INTRAMUSCULAR; INTRAVENOUS at 10:21

## 2019-11-04 RX ADMIN — ONDANSETRON 4 MG: 2 INJECTION INTRAMUSCULAR; INTRAVENOUS at 08:00

## 2019-11-04 RX ADMIN — FENTANYL CITRATE 50 MCG: 50 INJECTION, SOLUTION INTRAMUSCULAR; INTRAVENOUS at 09:22

## 2019-11-04 RX ADMIN — CEFAZOLIN 2 G: 10 INJECTION, POWDER, FOR SOLUTION INTRAVENOUS at 07:38

## 2019-11-04 RX ADMIN — ONDANSETRON 4 MG: 2 INJECTION INTRAMUSCULAR; INTRAVENOUS at 09:17

## 2019-11-04 RX ADMIN — PROPOFOL 50 MG: 10 INJECTION, EMULSION INTRAVENOUS at 07:46

## 2019-11-04 NOTE — ANESTHESIA PREPROCEDURE EVALUATION
Relevant Problems   No relevant active problems       Anesthetic History   No history of anesthetic complications            Review of Systems / Medical History  Patient summary reviewed and pertinent labs reviewed    Pulmonary  Within defined limits                 Neuro/Psych   Within defined limits           Cardiovascular    Hypertension: well controlled                   GI/Hepatic/Renal     GERD           Endo/Other        Morbid obesity     Other Findings              Physical Exam    Airway  Mallampati: II  TM Distance: 4 - 6 cm  Neck ROM: normal range of motion   Mouth opening: Normal     Cardiovascular  Regular rate and rhythm,  S1 and S2 normal,  no murmur, click, rub, or gallop             Dental    Dentition: Upper partial plate     Pulmonary  Breath sounds clear to auscultation               Abdominal  Abdominal exam normal       Other Findings            Anesthetic Plan    ASA: 3  Anesthesia type: general          Induction: Intravenous  Anesthetic plan and risks discussed with: Patient

## 2019-11-04 NOTE — INTERVAL H&P NOTE
H&P Update:  Emily Madera was seen and examined. History and physical has been reviewed. The patient has been examined.  There have been no significant clinical changes since the completion of the originally dated History and Physical.

## 2019-11-04 NOTE — PROGRESS NOTES
conducted a pre-surgery visit with Mickiel Hodgkin, who is a 64 y. o.,male. The  provided the following Interventions:  Initiated a relationship of care and support. Offered prayer and assurance of continued prayers on patient's behalf. Plan:  Chaplains will continue to follow and will provide pastoral care on an as needed/requested basis.  recommends bedside caregivers page  on duty if patient shows signs of acute spiritual or emotional distress.     [address] [place], [place] [address]  [phone]

## 2019-11-04 NOTE — PERIOP NOTES
Patient and wife made aware that if he is unable to void on his own; at home, and within 8 hours of discharge he is to return to emergency department. Patient voices understanding.

## 2019-11-04 NOTE — PERIOP NOTES
Straight catheter done; first attempt. Without urine return and patient complaint of pain with advancement; bladder scanned for greater bill 300mls. Patient reports \"swollen prostate\". straight cath redone using coude catheter. 400mls return.

## 2019-11-04 NOTE — ANESTHESIA POSTPROCEDURE EVALUATION
Procedure(s):  ROBOTIC ASSISTED LAPAROSCOPIC UMBILICAL HERNIA REPAIR WITH MESH.     general    Anesthesia Post Evaluation      Multimodal analgesia: multimodal analgesia used between 6 hours prior to anesthesia start to PACU discharge  Patient location during evaluation: bedside  Patient participation: complete - patient participated  Level of consciousness: awake  Pain management: adequate  Airway patency: patent  Anesthetic complications: no  Cardiovascular status: stable  Respiratory status: acceptable  Hydration status: acceptable  Post anesthesia nausea and vomiting:  controlled      Vitals Value Taken Time   /73 11/4/2019 10:54 AM   Temp     Pulse 99 11/4/2019 10:54 AM   Resp 14 11/4/2019 10:54 AM   SpO2 94 % 11/4/2019 10:54 AM

## 2019-11-04 NOTE — DISCHARGE INSTRUCTIONS
[facility] Specialists  Neno Walker MD, FACS  General Surgery    Pt may remove the dressing and shower in two days. Allow soap and water to run over the incision. No driving or operating heavy machinery while on narcotic pain medications. Please apply an ice pack to the umbilical area for 30 minutes three times daily for seven days to help reduce pain and swelling. No strenuous activity or contact sports for two weeks. No lifting greater than 15 lbs for 2 weeks. Call MD for any redness, swelling, bleeding or pus at the incision. Also call for any nausea, vomiting, increased pain or pain uncontrolled by pain medicine. Patient Education        Abdominal Hernia Repair: What to Expect at Home  Your Recovery  After surgery to repair your hernia, you are likely to have pain for a few days. You may also feel like you have the flu, and you may have a low fever and feel tired and nauseated. This is common. You should feel better after a few days and will probably feel much better in 7 days. For several weeks you may feel twinges or pulling in the hernia repair when you move. You may have some bruising around the area of your hernia repair. This is normal.  This care sheet gives you a general idea about how long it will take for you to recover. But each person recovers at a different pace. Follow the steps below to get better as quickly as possible. How can you care for yourself at home? Activity    · Rest when you feel tired. Getting enough sleep will help you recover.     · Try to walk each day. Start by walking a little more than you did the day before. Bit by bit, increase the amount you walk. Walking boosts blood flow and helps prevent pneumonia and constipation.     · If your doctor gives you an abdominal binder to wear, use it as directed. This is an elastic bandage that wraps around your belly and upper hips.  It helps support your belly muscles after surgery.     · Avoid strenuous activities, such as biking, jogging, weight lifting, or aerobic exercise, until your doctor says it is okay.     · Avoid lifting anything that would make you strain. This may include heavy grocery bags and milk containers, a heavy briefcase or backpack, cat litter or dog food bags, a vacuum , or a child.     · Ask your doctor when you can drive again.     · Most people are able to return to work within 1 to 2 weeks after surgery. But if your job requires that you to do heavy lifting or strenuous activity, you may need to take 4 to 6 weeks off from work.     · You may shower 24 to 48 hours after surgery, if your doctor okays it. Pat the cut (incision) dry. Do not take a bath for the first 2 weeks, or until your doctor tells you it is okay.     · Ask your doctor when it is okay for you to have sex. Diet    · You can eat your normal diet. If your stomach is upset, try bland, low-fat foods like plain rice, broiled chicken, toast, and yogurt.     · Drink plenty of fluids (unless your doctor tells you not to).     · You may notice that your bowel movements are not regular right after your surgery. This is common. Avoid constipation and straining with bowel movements. You may want to take a fiber supplement every day. If you have not had a bowel movement after a couple of days, ask your doctor about taking a mild laxative. Medicines    · Your doctor will tell you if and when you can restart your medicines. He or she will also give you instructions about taking any new medicines.     · If you take blood thinners, such as warfarin (Coumadin), clopidogrel (Plavix), or aspirin, be sure to talk to your doctor. He or she will tell you if and when to start taking those medicines again. Make sure that you understand exactly what your doctor wants you to do.     · Be safe with medicines. Take pain medicines exactly as directed.   ? If the doctor gave you a prescription medicine for pain, take it as prescribed. ? If you are not taking a prescription pain medicine, ask your doctor if you can take an over-the-counter medicine.     · If your doctor prescribed antibiotics, take them as directed. Do not stop taking them just because you feel better. You need to take the full course of antibiotics.     · If you think your pain medicine is making you sick to your stomach:  ? Take your medicine after meals (unless your doctor has told you not to). ? Ask your doctor for a different pain medicine. Incision care    · If you have strips of tape on the cut (incision) the doctor made, leave the tape on for a week or until it falls off. Or follow your doctor's instructions for removing the tape.     · If you have staples closing the cut, you will need to visit your doctor in 1 to 2 weeks to have them removed.     · Wash the area daily with warm, soapy water, and pat it dry. Don't use hydrogen peroxide or alcohol, which can slow healing. You may cover the area with a gauze bandage if it weeps or rubs against clothing. Change the bandage every day. Other instructions    · Hold a pillow over your incision when you cough or take deep breaths. This will support your belly and decrease your pain.     · Do breathing exercises at home as instructed by your doctor. This will help prevent pneumonia.     · If you had laparoscopic surgery, you may also have pain in your left shoulder. The pain usually lasts about a day or two. Follow-up care is a key part of your treatment and safety. Be sure to make and go to all appointments, and call your doctor if you are having problems. It's also a good idea to know your test results and keep a list of the medicines you take. When should you call for help? Call 911 anytime you think you may need emergency care.  For example, call if:    · You passed out (lost consciousness).     · You are short of breath.    Call your doctor now or seek immediate medical care if:    · You are sick to your stomach and cannot drink fluids.     · You have signs of a blood clot in your leg (called a deep vein thrombosis), such as:  ? Pain in your calf, back of the knee, thigh, or groin. ? Redness and swelling in your leg or groin.     · You have signs of infection, such as:  ? Increased pain, swelling, warmth, or redness. ? Red streaks leading from the incision. ? Pus draining from the incision. ? A fever.     · You cannot pass stools or gas.     · You have pain that does not get better after you take pain medicine.     · You have loose stitches, or your incision comes open.     · Bright red blood has soaked through the bandage over your incision.    Watch closely for changes in your health, and be sure to contact your doctor if you have any problems. Where can you learn more? Go to http://candelario-irineo.info/. Enter B577 in the search box to learn more about \"Abdominal Hernia Repair: What to Expect at Home. \"  Current as of: November 7, 2018  Content Version: 12.2  © 3663-2983 [employer]. Care instructions adapted under license by [employer] (which disclaims liability or warranty for this information). If you have questions about a medical condition or this instruction, always ask your healthcare professional. [address] any warranty or liability for your use of this information. DISCHARGE SUMMARY from Nurse    PATIENT INSTRUCTIONS:    After general anesthesia or intravenous sedation, for 24 hours or while taking prescription Narcotics:  · Limit your activities  · Do not drive and operate hazardous machinery  · Do not make important personal or business decisions  · Do  not drink alcoholic beverages  · If you have not urinated within 8 hours after discharge, please contact your surgeon on call.     Report the following to your surgeon:  · Excessive pain, swelling, redness or odor of or around the surgical area  · Temperature over 100.5  · Nausea and vomiting lasting longer than 4 hours or if unable to take medications  · Any signs of decreased circulation or nerve impairment to extremity: change in color, persistent  numbness, tingling, coldness or increase pain  · Any questions    What to do at Home:  Recommended activity: Activity as tolerated and no driving for today. *  Please give a list of your current medications to your Primary Care Provider. *  Please update this list whenever your medications are discontinued, doses are      changed, or new medications (including over-the-counter products) are added. *  Please carry medication information at all times in case of emergency situations. These are general instructions for a healthy lifestyle:    No smoking/ No tobacco products/ Avoid exposure to second hand smoke  Surgeon General's Warning:  Quitting smoking now greatly reduces serious risk to your health. Obesity, smoking, and sedentary lifestyle greatly increases your risk for illness    A healthy diet, regular physical exercise & weight monitoring are important for maintaining a healthy lifestyle    You may be retaining fluid if you have a history of heart failure or if you experience any of the following symptoms:  Weight gain of 3 pounds or more overnight or 5 pounds in a week, increased swelling in our hands or feet or shortness of breath while lying flat in bed. Please call your doctor as soon as you notice any of these symptoms; do not wait until your next office visit. The discharge information has been reviewed with the patient and spouse. The patient and spouse verbalized understanding. Discharge medications reviewed with the patient and spouse and appropriate educational materials and side effects teaching were provided.   ___________________________________________________________________________________________________________________________________

## 2019-11-04 NOTE — DISCHARGE SUMMARY
4 Luanne Jones MD, [facility]  General Surgery  Discharge Summary     Patient ID:  Jae Lainez  [id number]  43 y.o.  1960    Admit Date: 11/4/2019    Discharge Date: 11/4/2019    Admission Diagnoses: F01.8 UMBILICAL HERNIA WITHOUT OBSTRUCTION AND WITHOUT GANGRENE    Discharge Diagnoses:    Problem List as of 11/4/2019 Date Reviewed: 10/9/2019          Codes Class Noted - Resolved    Severe obesity ([address] ICD-10-CM: E66.01  ICD-9-CM: 278.01  10/22/2018 - Present        Elevated PSA ICD-10-CM: R97.20  ICD-9-CM: 790.93  4/11/2014 - Present        Nocturia ICD-10-CM: R35.1  ICD-9-CM: 788.43  4/11/2014 - Present        Family history of prostate cancer ICD-10-CM: Z80.42  ICD-9-CM: V16.42  4/11/2014 - Present        Pelvic pain ICD-10-CM: R10.2  ICD-9-CM: GWB5680  4/11/2014 - Present        Prostatitis ICD-10-CM: N41.9  ICD-9-CM: 601.9  4/11/2014 - Present        Hypercholesterolemia ICD-10-CM: E78.00  ICD-9-CM: 272.0  4/9/2014 - Present               Admission Condition: Good    Discharge Condition: Good    Last Procedure: Procedure(s):  ROBOTIC ASSISTED LAPAROSCOPIC [address] Course:   Normal hospital course for this procedure. Consults: None    Significant Diagnostic Studies: None    Disposition: home    Patient Instructions:   Current Discharge Medication List      START taking these medications    Details   oxyCODONE-acetaminophen (PERCOCET) 5-325 mg per tablet Take 1 Tab by mouth every six (6) hours as needed for Pain for up to 7 days. Max Daily Amount: 4 Tabs. Indications: pain  Qty: 25 Tab, Refills: 0    Associated Diagnoses: Postoperative pain         CONTINUE these medications which have CHANGED    Details   docusate sodium (COLACE) 100 mg capsule Take 1 Cap by mouth two (2) times a day. Qty: 60 Cap, Refills: 0      ibuprofen (MOTRIN) 800 mg tablet Take 1 Tab by mouth three (3) times daily as needed for Pain.  Indications: pain  Qty: 40 Tab, Refills: 0         CONTINUE these medications which have NOT CHANGED    Details   magnesium oxide (MAG-OX) 400 mg tablet Take 400 mg by mouth daily. omeprazole (PRILOSEC) 20 mg capsule Take 20 mg by mouth. tamsulosin (FLOMAX) 0.4 mg capsule Take 1 Cap by mouth daily (after dinner). Qty: 90 Cap, Refills: 3    Associated Diagnoses: Nocturia      atorvastatin (LIPITOR) 40 mg tablet Take 10 mg by mouth daily. STOP taking these medications       naproxen sodium (ALEVE) 220 mg tablet Comments:   Reason for Stopping:             Activity: See surgical instructions  Diet: Low fat, Low cholesterol  Wound Care: As directed    Follow-up with Dr. Dayday Tidwell in 2 weeks.   Follow-up tests/labs None    Signed:  Jacqueline Correa MD  11/4/2019  9:28 AM

## 2019-11-04 NOTE — OP NOTES
[place] [address] [address] [place]                                 OPERATIVE REPORT    PATIENT:     Jerry Poe  MRN:           [id number]    DATE:   2019  BILLIN  ROOM:        OR/PL  ATTENDING:   Deisi Daily MD  DICTATING:   Deisi Daily MD      PREOPERATIVE DIAGNOSIS: Umbilical hernia. POSTOPERATIVE DIAGNOSIS: Same hernia. PROCEDURES PERFORMED: Robot-assisted laparoscopic umbilical hernia repair  with mesh. SURGEON: Shay Estevez MD.    ASSISTANT: Verner Seek, SA    ANESTHESIA: General and local (0.25% Marcaine). FINDINGS: Umbilical hernia containing preperitoneal fat    SPECIMENS REMOVED: none    ESTIMATED BLOOD LOSS: 10 mL. FLUIDS: 1500 mL. IMPLANTS:  15.2 cm Ventralite ST with echo positioning system    OPERATIVE INDICATION: The patient with a painful umbilical hernia. DESCRIPTION OF OPERATION:  The patient was identified in the holding area, where consent for robot assisted laparoscopic umbilical hernia repair with mesh was verified. The patient was taken to the operating room  where he was placed under general anesthesia in the supine position. The  left arm was tucked to the patient's side. The abdomen was prepped and  draped in a sterile fashion using chlorhexidine solution and sterile  drapes. Kavita Kaye was used to add an additional protective barrier between the  skin and the foreign bodies. The time-out was performed to ensure correct  procedure. The local anesthetic was infiltrated into the skin and deep  dermal tissues at each trocar site prior to incision. The initial trocar was placed in the left upper quadrant in the  midclavicular line below the costal margin. This was to accommodate a 8 mm  blunt-tip trocar assembled to the 5 mm 0-degree scope to create the  Visi-Port system.  Safe entry into the peritoneal cavity was visualized. The  pneumoperitoneum was obtained using carbon dioxide gas to 15 mmHg. The  abdomen was briefly explored laparoscopically. The second 8 mm trocar was placed in the mid axillary line on the left side. A third trocar -12 mm -  was placed in the left lower quadrant, approximately 3 cm anterior medial  to the pubic anterior superior iliac crest.  The camera was then moved to the mid axillary trocar. The 15.2 cm Ventralite ST mesh with the Echo positioning system was placed into the peritoneal cavity. Two 0 V-Loc sutures were placed into the peritoneal cavity. The robot was then docked. The patient's position prior to docking was supine. The scissor was used at arm #1. The prograsp was used at arm #2 with the camera inserted in the midaxillary trocar. The attention was turned first to the exploration of the umbilical hernia defect. The peritoneum at the hernia defect and the hernia contents were reduced away from the hernia site with a combination of electrocautery and blunt dissection. The falciform ligament was then taken down cephalad. The balloon port of the Echo positioning system was then taken through the center of the  defect, and used to secure the mesh to the abdominal wall by inflating it. The V-Loc sutures were used to secure the mesh to the abdominal wall. The mesh sat nicely against the abdominal wall. The Echo Positioning  System was removed. Two needles were removed from the peritoneal cavity. The fascia at the 12 mm trocar site was closed using 0 Vicryl suture in the Dewey i-nexus fascial closure system. All trocars were removed under direct visualization. The additional local anesthetic was infiltrated into the skin and deep  dermal tissues at each trocar site. A 4-0 Vicryl suture was used to close  the skin at each trocar site. Mastisol, steristrips and bandaids and gauze and Tegaderm were used to create a sterile dressing.    The patient tolerated the procedure very well. DISPOSITION: He was stable, extubated upon transport to the recovery  room.         Pepe Regalado MD      D: 11/4/2019

## 2019-11-06 ENCOUNTER — APPOINTMENT (OUTPATIENT)
Dept: GENERAL RADIOLOGY | Age: 59
End: 2019-11-06
Attending: PHYSICIAN ASSISTANT
Payer: COMMERCIAL

## 2019-11-06 ENCOUNTER — APPOINTMENT (OUTPATIENT)
Dept: CT IMAGING | Age: 59
End: 2019-11-06
Attending: PHYSICIAN ASSISTANT
Payer: COMMERCIAL

## 2019-11-06 ENCOUNTER — HOSPITAL ENCOUNTER (EMERGENCY)
Age: 59
Discharge: HOME | End: 2019-11-06
Attending: EMERGENCY MEDICINE
Payer: COMMERCIAL

## 2019-11-06 VITALS
SYSTOLIC BLOOD PRESSURE: 130 MMHG | DIASTOLIC BLOOD PRESSURE: 103 MMHG | HEART RATE: 75 BPM | TEMPERATURE: 98.2 F | RESPIRATION RATE: 14 BRPM | OXYGEN SATURATION: 96 %

## 2019-11-06 DIAGNOSIS — R10.84 ABDOMINAL PAIN, GENERALIZED: ICD-10-CM

## 2019-11-06 DIAGNOSIS — K56.7 ILEUS (HCC): Primary | ICD-10-CM

## 2019-11-06 LAB
ALBUMIN SERPL-MCNC: 3.8 G/DL (ref 3.4–5)
ALBUMIN/GLOB SERPL: 1.1 {RATIO} (ref 0.8–1.7)
ALP SERPL-CCNC: 82 U/L (ref 45–117)
ALT SERPL-CCNC: 27 U/L (ref 16–61)
ANION GAP SERPL CALC-SCNC: 6 MMOL/L (ref 3–18)
AST SERPL-CCNC: 25 U/L (ref 10–38)
BILIRUB SERPL-MCNC: 0.8 MG/DL (ref 0.2–1)
BUN SERPL-MCNC: 13 MG/DL (ref 7–18)
BUN/CREAT SERPL: 14 (ref 12–20)
CALCIUM SERPL-MCNC: 9 MG/DL (ref 8.5–10.1)
CHLORIDE SERPL-SCNC: 108 MMOL/L (ref 100–111)
CO2 SERPL-SCNC: 27 MMOL/L (ref 21–32)
CREAT SERPL-MCNC: 0.91 MG/DL (ref 0.6–1.3)
GLOBULIN SER CALC-MCNC: 3.5 G/DL (ref 2–4)
GLUCOSE SERPL-MCNC: 123 MG/DL (ref 74–99)
POTASSIUM SERPL-SCNC: 3.8 MMOL/L (ref 3.5–5.5)
PROT SERPL-MCNC: 7.3 G/DL (ref 6.4–8.2)
SODIUM SERPL-SCNC: 141 MMOL/L (ref 136–145)

## 2019-11-06 PROCEDURE — 74011250636 HC RX REV CODE- 250/636: Performed by: EMERGENCY MEDICINE

## 2019-11-06 PROCEDURE — 96374 THER/PROPH/DIAG INJ IV PUSH: CPT

## 2019-11-06 PROCEDURE — 96375 TX/PRO/DX INJ NEW DRUG ADDON: CPT

## 2019-11-06 PROCEDURE — 74011250636 HC RX REV CODE- 250/636: Performed by: PHYSICIAN ASSISTANT

## 2019-11-06 PROCEDURE — 74018 RADEX ABDOMEN 1 VIEW: CPT

## 2019-11-06 PROCEDURE — 74011636320 HC RX REV CODE- 636/320: Performed by: EMERGENCY MEDICINE

## 2019-11-06 PROCEDURE — 80053 COMPREHEN METABOLIC PANEL: CPT

## 2019-11-06 PROCEDURE — 74177 CT ABD & PELVIS W/CONTRAST: CPT

## 2019-11-06 PROCEDURE — 99282 EMERGENCY DEPT VISIT SF MDM: CPT

## 2019-11-06 PROCEDURE — 96376 TX/PRO/DX INJ SAME DRUG ADON: CPT

## 2019-11-06 RX ORDER — METOCLOPRAMIDE HYDROCHLORIDE 5 MG/ML
10 INJECTION INTRAMUSCULAR; INTRAVENOUS
Status: COMPLETED | OUTPATIENT
Start: 2019-11-06 | End: 2019-11-06

## 2019-11-06 RX ORDER — METOCLOPRAMIDE 10 MG/1
10 TABLET ORAL
Qty: 12 TAB | Refills: 0 | Status: SHIPPED | OUTPATIENT
Start: 2019-11-06 | End: 2019-11-16

## 2019-11-06 RX ORDER — MORPHINE SULFATE 4 MG/ML
4 INJECTION, SOLUTION INTRAMUSCULAR; INTRAVENOUS
Status: COMPLETED | OUTPATIENT
Start: 2019-11-06 | End: 2019-11-06

## 2019-11-06 RX ORDER — ONDANSETRON 2 MG/ML
4 INJECTION INTRAMUSCULAR; INTRAVENOUS
Status: COMPLETED | OUTPATIENT
Start: 2019-11-06 | End: 2019-11-06

## 2019-11-06 RX ADMIN — MORPHINE SULFATE 4 MG: 4 INJECTION, SOLUTION INTRAMUSCULAR; INTRAVENOUS at 03:14

## 2019-11-06 RX ADMIN — IOPAMIDOL 100 ML: 612 INJECTION, SOLUTION INTRAVENOUS at 03:53

## 2019-11-06 RX ADMIN — MORPHINE SULFATE 4 MG: 4 INJECTION, SOLUTION INTRAMUSCULAR; INTRAVENOUS at 02:17

## 2019-11-06 RX ADMIN — ONDANSETRON 4 MG: 2 INJECTION INTRAMUSCULAR; INTRAVENOUS at 02:17

## 2019-11-06 RX ADMIN — METOCLOPRAMIDE 10 MG: 5 INJECTION, SOLUTION INTRAMUSCULAR; INTRAVENOUS at 06:03

## 2019-11-06 NOTE — ED PROVIDER NOTES
EMERGENCY DEPARTMENT HISTORY AND PHYSICAL EXAM    Date: 11/6/2019  Patient Name: Heidi Medeiros    History of Presenting Illness     Chief Complaint   Patient presents with    Post-Op Problem         History Provided By: Patient        Additional History (Context): Heidi Medeiros is a 64 y.o. male with Recent history of robotic laparoscopic umbilical hernia repair 2 days ago who presents with a complaint of moderate to severe abdominal cramping with distention status post this procedure. Patient denies flatus last bowel movement was prior to his surgery. Patient states his pain is cramping in nature. Patient denies nausea, vomiting; he does report a history of constipation and is currently taking opioid pain medications for his symptoms. Patient did take a stool softener rectally and a dose of MiraLAX thinking his symptoms were related to constipation. Patient denies breath, chest pain, difficulty breathing or calf pain. PCP: Sinai Hercules MD    Current Outpatient Medications   Medication Sig Dispense Refill    docusate sodium (COLACE) 100 mg capsule Take 1 Cap by mouth two (2) times a day. 60 Cap 0    ibuprofen (MOTRIN) 800 mg tablet Take 1 Tab by mouth three (3) times daily as needed for Pain. Indications: pain 40 Tab 0    oxyCODONE-acetaminophen (PERCOCET) 5-325 mg per tablet Take 1 Tab by mouth every six (6) hours as needed for Pain for up to 7 days. Max Daily Amount: 4 Tabs. Indications: pain 25 Tab 0    magnesium oxide (MAG-OX) 400 mg tablet Take 400 mg by mouth daily.  omeprazole (PRILOSEC) 20 mg capsule Take 20 mg by mouth.  tamsulosin (FLOMAX) 0.4 mg capsule Take 1 Cap by mouth daily (after dinner). 90 Cap 3    atorvastatin (LIPITOR) 40 mg tablet Take 10 mg by mouth daily.          Past History     Past Medical History:  Past Medical History:   Diagnosis Date    BPH (benign prostatic hypertrophy)     GERD (gastroesophageal reflux disease)     Herniated nucleus pulposus  High cholesterol     Hypertension     no meds     Inflammatory bowel disease        Past Surgical History:  Past Surgical History:   Procedure Laterality Date    HX COLONOSCOPY      HX KNEE REPLACEMENT Right 06/2019    HX ORTHOPAEDIC      HX VASECTOMY         Family History:  Family History   Problem Relation Age of Onset    Hypertension Father     Stroke Mother     Cancer Mother        Social History:  Social History     Tobacco Use    Smoking status: Never Smoker    Smokeless tobacco: Never Used   Substance Use Topics    Alcohol use: Yes     Alcohol/week: 21.0 standard drinks     Types: 7 Cans of beer, 14 Shots of liquor per week    Drug use: No       Allergies:  No Known Allergies      Review of Systems   Review of Systems  Review of Systems   Constitutional: Negative for fatigue and fever. HENT: Negative for congestion. Respiratory: Negative for cough and shortness of breath. Cardiovascular: Negative for chest pain. Gastrointestinal: Positive for abdominal pain, without diarrhea, nausea and vomiting. Last BM prior to surgery, pt denies flatus. Genitourinary: Negative for difficulty urinating and dysuria. Musculoskeletal: Negative joint pain, joint swelling, recent injury. Skin: Negative for wound. Neurological: Negative for dizziness and headaches. All other systems reviewed and are negative. All Other Systems Negative  Physical Exam     Vitals:    11/06/19 0149 11/06/19 0150   BP:  (!) 130/103   Pulse: 75 75   Resp: 14 14   Temp:  98.2 °F (36.8 °C)   SpO2: 96% 95%     Physical Exam     Constitutional: Pt is oriented to person, place, and time. Pt appears well-developed and well-nourished. HENT:   Head: Normocephalic and atraumatic. Mouth/Throat: Oropharynx is clear and moist.   Eyes: Pupils are equal, round, and reactive to light. Neck: Normal range of motion. Neck supple. Cardiovascular: Normal rate, regular rhythm and normal heart sounds.  No murmur heard.  Pulmonary/Chest: Effort normal and breath sounds normal. No respiratory distress. No wheezes or rales. Abdominal:  Distension is present, tympanic. There is generalized tenderness. There is no rebound and no guarding. Musculoskeletal: Normal range of motion. No edema or deformity. Neurological: Pt is alert and oriented to person, place, and time   Skin: Skin is warm and dry. Psychiatric: Pt has a normal mood and affect;  behavior is normal. Judgment and thought content normal.           Diagnostic Study Results     Labs -     Recent Results (from the past 12 hour(s))   METABOLIC PANEL, COMPREHENSIVE    Collection Time: 11/06/19  2:09 AM   Result Value Ref Range    Sodium 141 136 - 145 mmol/L    Potassium 3.8 3.5 - 5.5 mmol/L    Chloride 108 100 - 111 mmol/L    CO2 27 21 - 32 mmol/L    Anion gap 6 3.0 - 18 mmol/L    Glucose 123 (H) 74 - 99 mg/dL    BUN 13 7.0 - 18 MG/DL    Creatinine 0.91 0.6 - 1.3 MG/DL    BUN/Creatinine ratio 14 12 - 20      GFR est AA >60 >60 ml/min/1.73m2    GFR est non-AA >60 >60 ml/min/1.73m2    Calcium 9.0 8.5 - 10.1 MG/DL    Bilirubin, total 0.8 0.2 - 1.0 MG/DL    ALT (SGPT) 27 16 - 61 U/L    AST (SGOT) 25 10 - 38 U/L    Alk. phosphatase 82 45 - 117 U/L    Protein, total 7.3 6.4 - 8.2 g/dL    Albumin 3.8 3.4 - 5.0 g/dL    Globulin 3.5 2.0 - 4.0 g/dL    A-G Ratio 1.1 0.8 - 1.7         Radiologic Studies -   XR ABD (KUB)    (Results Pending)   CT ABD PELV W CONT    (Results Pending)     CT Results  (Last 48 hours)    None        CXR Results  (Last 48 hours)    None            Medical Decision Making   I am the first provider for this patient. I reviewed the vital signs, available nursing notes, past medical history, past surgical history, family history and social history. Vital Signs-Reviewed the patient's vital signs.        Comparison:    Records Reviewed: Nursing Notes and Old Medical Records    Procedures:  Procedures    Provider Notes (Medical Decision Making): Patient has significant dilated loops of bowel on KUB. CT of abdomen and pelvis was ordered differential diagnosis includes ileus, small bowel obstruction no free air is seen on x-ray. MED RECONCILIATION:  No current facility-administered medications for this encounter. Current Outpatient Medications   Medication Sig    docusate sodium (COLACE) 100 mg capsule Take 1 Cap by mouth two (2) times a day.  ibuprofen (MOTRIN) 800 mg tablet Take 1 Tab by mouth three (3) times daily as needed for Pain. Indications: pain    oxyCODONE-acetaminophen (PERCOCET) 5-325 mg per tablet Take 1 Tab by mouth every six (6) hours as needed for Pain for up to 7 days. Max Daily Amount: 4 Tabs. Indications: pain    magnesium oxide (MAG-OX) 400 mg tablet Take 400 mg by mouth daily.  omeprazole (PRILOSEC) 20 mg capsule Take 20 mg by mouth.  tamsulosin (FLOMAX) 0.4 mg capsule Take 1 Cap by mouth daily (after dinner).  atorvastatin (LIPITOR) 40 mg tablet Take 10 mg by mouth daily. Disposition:      Follow-up Information    None         Current Discharge Medication List              Diagnosis     Clinical Impression: No diagnosis found.

## 2019-11-06 NOTE — ED NOTES
I have reviewed discharge instructions with the patient. The patient verbalized understanding. Patient armband removed and given to patient to take home. Patient was informed of the privacy risks if armband lost or stolen. Patient was escorted to the car by wheelchair. No complaints of shortness of breath.

## 2019-11-06 NOTE — ED TRIAGE NOTES
Wife states patient had hernia surgery yesterday. Patient presents to the ED with complaints of abdominal pain in the epigastric region.  Wife states patient took a percocet to day at 1000

## 2019-11-06 NOTE — DISCHARGE INSTRUCTIONS
Patient Education        Abdominal Pain: Care Instructions  Your Care Instructions    Abdominal pain has many possible causes. Some aren't serious and get better on their own in a few days. Others need more testing and treatment. If your pain continues or gets worse, you need to be rechecked and may need more tests to find out what is wrong. You may need surgery to correct the problem. Don't ignore new symptoms, such as fever, nausea and vomiting, urination problems, pain that gets worse, and dizziness. These may be signs of a more serious problem. Your doctor may have recommended a follow-up visit in the next 8 to 12 hours. If you are not getting better, you may need more tests or treatment. The doctor has checked you carefully, but problems can develop later. If you notice any problems or new symptoms, get medical treatment right away. Follow-up care is a key part of your treatment and safety. Be sure to make and go to all appointments, and call your doctor if you are having problems. It's also a good idea to know your test results and keep a list of the medicines you take. How can you care for yourself at home? · Rest until you feel better. · To prevent dehydration, drink plenty of fluids, enough so that your urine is light yellow or clear like water. Choose water and other caffeine-free clear liquids until you feel better. If you have kidney, heart, or liver disease and have to limit fluids, talk with your doctor before you increase the amount of fluids you drink. · If your stomach is upset, eat mild foods, such as rice, dry toast or crackers, bananas, and applesauce. Try eating several small meals instead of two or three large ones. · Wait until 48 hours after all symptoms have gone away before you have spicy foods, alcohol, and drinks that contain caffeine. · Do not eat foods that are high in fat. · Avoid anti-inflammatory medicines such as aspirin, ibuprofen (Advil, Motrin), and naproxen (Aleve). These can cause stomach upset. Talk to your doctor if you take daily aspirin for another health problem. When should you call for help? Call 911 anytime you think you may need emergency care. For example, call if:    · You passed out (lost consciousness).     · You pass maroon or very bloody stools.     · You vomit blood or what looks like coffee grounds.     · You have new, severe belly pain.    Call your doctor now or seek immediate medical care if:    · Your pain gets worse, especially if it becomes focused in one area of your belly.     · You have a new or higher fever.     · Your stools are black and look like tar, or they have streaks of blood.     · You have unexpected vaginal bleeding.     · You have symptoms of a urinary tract infection. These may include:  ? Pain when you urinate. ? Urinating more often than usual.  ? Blood in your urine.     · You are dizzy or lightheaded, or you feel like you may faint.    Watch closely for changes in your health, and be sure to contact your doctor if:    · You are not getting better after 1 day (24 hours). Where can you learn more? Go to http://candelarioCuPcAkE & other things you bakeirineo.info/. Enter P815 in the search box to learn more about \"Abdominal Pain: Care Instructions. \"  Current as of: June 26, 2019  Content Version: 12.2  © 9214-3310 [employer]. Care instructions adapted under license by [employer] (which disclaims liability or warranty for this information). If you have questions about a medical condition or this instruction, always ask your healthcare professional. [place] [address] any warranty or liability for your use of this information. Patient Education     Learning About Ileus  What is ileus? Ileus (say \"ILL-ee-us\") is a blockage of the bowel (intestines) that happens when the intestines don't work as they should. Normally, muscles in the intestines squeeze to push food and waste along.  When this process stops, the intestines stop digesting food and moving waste out of the body. This may also be called paralytic ileus. Ileus is not the same as a mechanical bowel obstruction. In a mechanical bowel obstruction, something is actually blocking the intestine, like scar tissue or a tumor. That is not true in ileus. Ileus sometimes happens after surgery to the belly. But it can also be caused by other things, such as some medicines, certain diseases or infections, and nerve problems. The doctor may do a number of tests. These tests may include X-rays, blood tests, and a CT scan. Testing can help the doctor be sure that nothing is blocking the intestines. Most people who have ileus need to be treated in the hospital.  What are the symptoms? Symptoms may include:  · Cramping belly pain. · Bloating. · Nausea or vomiting. · Not passing stool or gas. How is ileus treated? You will need to avoid eating solid food until you are better. Instead, you will get fluids and nutrition through a vein (IV). This helps prevent dehydration. It also lets your bowel rest.  You may have a tube that goes through your nose and into your stomach. This can help ease pain and bloating. You may get other treatments, depending on what caused ileus. For example, a medicine might be stopped if it is affecting your bowel. The intestines will often start working again in a few days. Signs of this include being able to pass gas or have a bowel movement. As your intestines start working, you will switch slowly from a liquid diet back to solid foods. Follow-up care is a key part of your treatment and safety. Be sure to make and go to all appointments, and call your doctor if you are having problems. It's also a good idea to know your test results and keep a list of the medicines you take. Where can you learn more? Go to [employer].be  Enter M933 in the search box to learn more about \"Learning About Ileus. \"   © 8836-6411 [employer]. Care instructions adapted under license by [employer] (which disclaims liability or warranty for this information). This care instruction is for use with your licensed healthcare professional. If you have questions about a medical condition or this instruction, always ask your healthcare professional. [address] any warranty or liability for your use of this information.   Content Version: 45.1.630378; Current as of: November 20, 2015

## 2019-11-19 ENCOUNTER — OFFICE VISIT (OUTPATIENT)
Dept: SURGERY | Age: 59
End: 2019-11-19

## 2019-11-19 VITALS
HEIGHT: 67 IN | DIASTOLIC BLOOD PRESSURE: 72 MMHG | TEMPERATURE: 97.9 F | OXYGEN SATURATION: 95 % | RESPIRATION RATE: 19 BRPM | BODY MASS INDEX: 35.47 KG/M2 | HEART RATE: 69 BPM | SYSTOLIC BLOOD PRESSURE: 148 MMHG | WEIGHT: 226 LBS

## 2019-11-19 DIAGNOSIS — Z09 POSTOPERATIVE EXAMINATION: Primary | ICD-10-CM

## 2019-11-19 NOTE — PROGRESS NOTES
[facility]  General Surgery    Name: Tai Curry MRN: [id number]   : 1960 [facility]   Date: 2019 Admission Date: No admission date for patient encounter. Subjective:  Patient presents today without complaints. He did have some issues with constipation immediately after surgery. He had dry heaves because of the constipation. He developed more pain in the abdomen with a dry heaves. This is now all resolved. Objective:  Vitals:    19 0958   BP: 148/72   Pulse: 69   Resp: 19   Temp: 97.9 °F (36.6 °C)   TempSrc: Oral   SpO2: 95%   Weight: 102.5 kg (226 lb)   Height: 5' 7\" (1.702 m)       Physical Exam:    General: Awake and alert, oriented x4, no apparent distress,   Abdomen: abdomen is soft with minimal incisional and periumbilical tenderness. There is edema and some pressure related redness at the umbilicus. No evidence of hernia recurrence. No evidence of cellulitis. Incision(s) are C/D/I. No   masses, organomegaly or guarding    Current Medications:  Current Outpatient Medications   Medication Sig Dispense Refill    docusate sodium (COLACE) 100 mg capsule Take 1 Cap by mouth two (2) times a day. 60 Cap 0    ibuprofen (MOTRIN) 800 mg tablet Take 1 Tab by mouth three (3) times daily as needed for Pain. Indications: pain 40 Tab 0    magnesium oxide (MAG-OX) 400 mg tablet Take 400 mg by mouth daily.  omeprazole (PRILOSEC) 20 mg capsule Take 20 mg by mouth.  tamsulosin (FLOMAX) 0.4 mg capsule Take 1 Cap by mouth daily (after dinner). 90 Cap 3    atorvastatin (LIPITOR) 40 mg tablet Take 10 mg by mouth daily. Chart and notes reviewed. Data reviewed. I have evaluated and examined the patient. IMPRESSION:   · Patient is doing well 2 weeks out from robot-assisted laparoscopic umbilical hernia repair with mesh. PLAN:/DISCUSION:   · Patient may lift up to 50 pounds with proper lifting mechanics.   · Follow-up in 4 weeks for reexamination.         Han Ayala MD

## 2019-12-17 ENCOUNTER — OFFICE VISIT (OUTPATIENT)
Dept: SURGERY | Age: 59
End: 2019-12-17

## 2019-12-17 VITALS
SYSTOLIC BLOOD PRESSURE: 140 MMHG | HEIGHT: 68 IN | DIASTOLIC BLOOD PRESSURE: 98 MMHG | BODY MASS INDEX: 33.04 KG/M2 | RESPIRATION RATE: 18 BRPM | HEART RATE: 74 BPM | OXYGEN SATURATION: 93 % | TEMPERATURE: 97.1 F | WEIGHT: 218 LBS

## 2019-12-17 DIAGNOSIS — Z09 POSTOPERATIVE EXAMINATION: Primary | ICD-10-CM

## 2019-12-18 NOTE — PROGRESS NOTES
[employer] [facility]  General Surgery    Name: Ansley Wilson MRN: [id number]   : 1960 [facility]   Date: 2019 Admission Date: No admission date for patient encounter. Subjective:  Patient presents today's 6 weeks out from robot-assisted laparoscopic hernia repair doing well. Objective:  Vitals:    19 1437 19 1439   BP: (!) 146/101 (!) 140/98   Pulse: 74    Resp: 18    Temp: 97.1 °F (36.2 °C)    TempSrc: Oral    SpO2: 93%    Weight: 98.9 kg (218 lb)    Height: 5' 8\" (1.727 m)        Physical Exam:    General: Awake and alert, oriented x4, no apparent distress   Abdomen: abdomen is soft with periumbilical tenderness. Incision(s) are C/D/I. Small seroma present  at the umbilicus. No masses, organomegaly or guarding    Current Medications:  Current Outpatient Medications   Medication Sig Dispense Refill    magnesium oxide (MAG-OX) 400 mg tablet Take 400 mg by mouth daily.  omeprazole (PRILOSEC) 20 mg capsule Take 20 mg by mouth.  tamsulosin (FLOMAX) 0.4 mg capsule Take 1 Cap by mouth daily (after dinner). 90 Cap 3    atorvastatin (LIPITOR) 40 mg tablet Take 10 mg by mouth daily.  levoFLOXacin (LEVAQUIN) 750 mg tablet Take 1 Tab by mouth daily. Take once a day for three days starting day of procedure 3 Tab 0    docusate sodium (COLACE) 100 mg capsule Take 1 Cap by mouth two (2) times a day. 60 Cap 0    ibuprofen (MOTRIN) 800 mg tablet Take 1 Tab by mouth three (3) times daily as needed for Pain. Indications: pain 40 Tab 0       Chart and notes reviewed. Data reviewed. I have evaluated and examined the patient. IMPRESSION:   · Patient doing well.       PLAN:/DISCUSION:   · Follow-up RETA Garcia MD

## 2020-09-10 PROBLEM — M17.11 LOCALIZED PRIMARY OSTEOARTHRITIS OF LOWER LEG, RIGHT: Status: ACTIVE | Noted: 2019-04-11

## 2020-09-10 PROBLEM — M17.11 ARTHRITIS OF RIGHT KNEE: Status: ACTIVE | Noted: 2019-04-11

## 2020-09-10 PROBLEM — K21.9 ESOPHAGEAL REFLUX: Status: ACTIVE | Noted: 2019-04-11

## 2021-03-02 PROBLEM — C61 MALIGNANT NEOPLASM OF PROSTATE (HCC): Status: ACTIVE | Noted: 2020-10-12

## 2022-03-11 ENCOUNTER — OFFICE VISIT (OUTPATIENT)
Dept: SURGERY | Age: 62
End: 2022-03-11
Payer: COMMERCIAL

## 2022-03-11 VITALS
BODY MASS INDEX: 33.95 KG/M2 | HEART RATE: 72 BPM | DIASTOLIC BLOOD PRESSURE: 82 MMHG | HEIGHT: 68 IN | WEIGHT: 224 LBS | SYSTOLIC BLOOD PRESSURE: 132 MMHG | RESPIRATION RATE: 18 BRPM | TEMPERATURE: 97.8 F | OXYGEN SATURATION: 97 %

## 2022-03-11 DIAGNOSIS — R19.00 ABDOMINAL WALL BULGE: Primary | ICD-10-CM

## 2022-03-11 PROCEDURE — 99213 OFFICE O/P EST LOW 20 MIN: CPT | Performed by: SURGERY

## 2022-03-11 RX ORDER — ROSUVASTATIN CALCIUM 20 MG/1
TABLET, COATED ORAL
COMMUNITY

## 2022-03-11 RX ORDER — SERTRALINE HYDROCHLORIDE 50 MG/1
TABLET, FILM COATED ORAL
COMMUNITY

## 2022-03-11 NOTE — PROGRESS NOTES
Christopher Boateng is a 63 y.o. male  Chief Complaint   Patient presents with    Follow-up     abdominal pain. history of umbilical hernia. 1. Have you been to the ER, urgent care clinic since your last visit? Hospitalized since your last visit? Yes When: unsure Where: [facility] Reason for visit: constipation and abd pain over one year ago    2. Have you seen or consulted any other health care providers outside of the [address] [place] [address] since your last visit? Include any pap smears or colon screening.  Yes Where: [facility]

## 2022-03-18 PROBLEM — K21.9 ESOPHAGEAL REFLUX: Status: ACTIVE | Noted: 2019-04-11

## 2022-03-19 PROBLEM — M17.11 LOCALIZED PRIMARY OSTEOARTHRITIS OF LOWER LEG, RIGHT: Status: ACTIVE | Noted: 2019-04-11

## 2022-03-19 PROBLEM — M17.11 ARTHRITIS OF RIGHT KNEE: Status: ACTIVE | Noted: 2019-04-11

## 2022-03-19 PROBLEM — C61 MALIGNANT NEOPLASM OF PROSTATE (HCC): Status: ACTIVE | Noted: 2020-10-12

## 2022-03-19 PROBLEM — E66.01 SEVERE OBESITY: Status: ACTIVE | Noted: 2018-10-22

## 2022-03-20 NOTE — PROGRESS NOTES
[facility]  General Surgery    Name: Meagn Gibson MRN: [id number]   : 1960 [facility]   Date: 3/20/2022 Admission Date: No admission date for patient encounter. Subjective:  Patient returns today little over 2 years out from robot-assisted laparoscopic umbilical hernia repair. Since that time he has had a prostatectomy. This was performed at [facility].  He has concerns about bulging at the previous hernia repair site. Objective:  Vitals:    22 1454   BP: 132/82   Pulse: 72   Resp: 18   Temp: 97.8 °F (36.6 °C)   TempSrc: Temporal   SpO2: 97%   Weight: 101.6 kg (224 lb)   Height: 5' 8\" (1.727 m)       Physical Exam:    General: Awake and alert, oriented x4, no apparent distress   Abdomen: abdomen is soft without tenderness. No evidence of hernia recurrence with Valsalva or cough. No masses, organomegaly or guarding    Current Medications:  Current Outpatient Medications   Medication Sig Dispense Refill    rosuvastatin (CRESTOR) 20 mg tablet       sertraline (Zoloft) 50 mg tablet       tadalafiL (CIALIS) 5 mg tablet TAKE ONE TABLET BY MOUTH DAILY 90 Tablet 3    sildenafil citrate (VIAGRA) 100 mg tablet Take 1 tab my mouth as needed 1 hour prior to sexual activity on an empty stomach. Indications: the inability to have an erection 30 Tab 3    docusate sodium (COLACE) 100 mg capsule Take 1 Cap by mouth two (2) times a day. 60 Cap 0    ibuprofen (MOTRIN) 800 mg tablet Take 1 Tab by mouth three (3) times daily as needed for Pain. Indications: pain 40 Tab 0    magnesium oxide (MAG-OX) 400 mg tablet Take 400 mg by mouth daily.  omeprazole (PRILOSEC) 20 mg capsule Take 20 mg by mouth.  atorvastatin (LIPITOR) 40 mg tablet Take 10 mg by mouth daily. Chart and notes reviewed. Data reviewed. I have evaluated and examined the patient. IMPRESSION:   · Patient without evidence of hernia recurrence on examination. PLAN:/DISCUSION:   ·  Follow-up as needed        Raciel Worley MD

## 2023-05-22 NOTE — ED PROVIDER NOTES
EMERGENCY DEPARTMENT HISTORY AND PHYSICAL EXAM    Date: 4/15/2019  Patient Name: Farnaz Bahena    History of Presenting Illness     Chief Complaint   Patient presents with    Knee Pain     right         History Provided By: patient        Additional History (Context): Farnaz Bahena is a 53-year-old male here with complaint of right knee and lower extremity pain. Patient states he had a total knee replacement 4 days ago by Dr. Gurjit Sierra, [facility] orthopedic specialist.  States he was discharged home yesterday Percocet 7.5 twice a day as needed for pain. States pain has been uncontrolled and has noted some worsening redness and swelling in is taking Lovenox as directed. No shortness of breath, chest pain. Patient and wife states that they attempted to call orthopedist tonight and was told to come to ED if pain was not controlled. Denies any numbness, tingling, urinary symptoms, fever, chills or any other complaints at this time. PCP: Damion Casillas MD    Current Outpatient Medications   Medication Sig Dispense Refill    meloxicam (MOBIC) 15 mg tablet Take 1 Tab by mouth daily (with breakfast). 30 Tab 0    naproxen sodium (ALEVE) 220 mg tablet Take 220 mg by mouth two (2) times daily (with meals).  tamsulosin (FLOMAX) 0.4 mg capsule Take 1 Cap by mouth daily (after dinner). 90 Cap 3    ibuprofen (MOTRIN) 800 mg tablet Take  by mouth.  atorvastatin (LIPITOR) 40 mg tablet Take  by mouth daily.          Past History     Past Medical History:  Past Medical History:   Diagnosis Date    BPH (benign prostatic hypertrophy)     GERD (gastroesophageal reflux disease)     Herniated nucleus pulposus     High cholesterol     Hypertension        Past Surgical History:  Past Surgical History:   Procedure Laterality Date    HX COLONOSCOPY         Family History:  Family History   Problem Relation Age of Onset    Hypertension Father     Stroke Mother     Cancer Mother        Social History:  Social History     Tobacco Use    Smoking status: Never Smoker    Smokeless tobacco: Never Used   Substance Use Topics    Alcohol use: Yes     Alcohol/week: 12.6 oz     Types: 7 Cans of beer, 14 Shots of liquor per week    Drug use: No       Allergies:  No Known Allergies      Review of Systems       Review of Systems   Constitutional: Negative for chills and fever. HENT: Negative for nasal congestion, sore throat, rhinorrhea  Eyes: Negative. Respiratory: Negative for cough and negative for shortness of breath. Cardiovascular: Negative for chest pain and palpitations. Gastrointestinal: Negative for abdominal pain, constipation, diarrhea, nausea and vomiting. Genitourinary: Negative. Negative for difficulty urinating and flank pain. Musculoskeletal: Negative for back pain. Positive for leg pain and swelling negative for gait problem and neck pain. Skin: Negative. Allergic/Immunologic: Negative. Neurological: Negative for dizziness, weakness, numbness and headaches. Psychiatric/Behavioral: Negative. All other systems reviewed and are negative. All Other Systems Negative  Physical Exam     Vitals:    04/15/19 2240 04/16/19 0236   BP: 122/84 129/76   Pulse: 74 80   Resp: 18 16   Temp: 100.3 °F (37.9 °C) 98.7 °F (37.1 °C)   SpO2: 94% 96%     Physical Exam   Constitutional: He is oriented to person, place, and time. He appears well-developed and well-nourished. No distress. HENT:   Head: Normocephalic and atraumatic. Nose: Nose normal.   Eyes: Pupils are equal, round, and reactive to light. Conjunctivae and EOM are normal.   Neck: Normal range of motion. Neck supple. Cardiovascular: Normal rate and regular rhythm. Pulmonary/Chest: Effort normal and breath sounds normal. No respiratory distress. Abdominal: Soft. Musculoskeletal: Normal range of motion. Right knee: He exhibits laceration.  He exhibits normal range of motion, no swelling, no effusion, no ecchymosis, no deformity, no erythema, normal alignment, no LCL laxity and no bony tenderness. Tenderness found. Right ankle: He exhibits swelling. He exhibits normal range of motion, no ecchymosis, no deformity, no laceration and normal pulse. No tenderness. Achilles tendon normal.        Legs:  Neurological: He is alert and oriented to person, place, and time. He has normal strength. No cranial nerve deficit or sensory deficit. Gait abnormal. Coordination normal.   Reflex Scores:       Patellar reflexes are 2+ on the right side. Achilles reflexes are 2+ on the right side. Gait not assessed   Skin: Skin is warm. No rash noted. He is not diaphoretic. Psychiatric: He has a normal mood and affect. His behavior is normal.   Nursing note and vitals reviewed. Diagnostic Study Results     Labs -     Recent Results (from the past 12 hour(s))   CBC WITH AUTOMATED DIFF    Collection Time: 04/15/19 10:32 PM   Result Value Ref Range    WBC 9.9 4.6 - 13.2 K/uL    RBC 3.32 (L) 4.70 - 5.50 M/uL    HGB 10.6 (L) 13.0 - 16.0 g/dL    HCT 30.6 (L) 36.0 - 48.0 %    MCV 92.2 74.0 - 97.0 FL    MCH 31.9 24.0 - 34.0 PG    MCHC 34.6 31.0 - 37.0 g/dL    RDW 12.5 11.6 - 14.5 %    PLATELET 576 951 - 002 K/uL    MPV 9.2 9.2 - 11.8 FL    NEUTROPHILS 70 40 - 73 %    LYMPHOCYTES 15 (L) 21 - 52 %    MONOCYTES 12 (H) 3 - 10 %    EOSINOPHILS 3 0 - 5 %    BASOPHILS 0 0 - 2 %    ABS. NEUTROPHILS 7.0 1.8 - 8.0 K/UL    ABS. LYMPHOCYTES 1.5 0.9 - 3.6 K/UL    ABS. MONOCYTES 1.2 0.05 - 1.2 K/UL    ABS. EOSINOPHILS 0.3 0.0 - 0.4 K/UL    ABS.  BASOPHILS 0.0 0.0 - 0.1 K/UL    DF AUTOMATED     METABOLIC PANEL, COMPREHENSIVE    Collection Time: 04/15/19 10:32 PM   Result Value Ref Range    Sodium 136 136 - 145 mmol/L    Potassium 3.7 3.5 - 5.5 mmol/L    Chloride 100 100 - 108 mmol/L    CO2 28 21 - 32 mmol/L    Anion gap 8 3.0 - 18 mmol/L    Glucose 105 (H) 74 - 99 mg/dL    BUN 13 7.0 - 18 MG/DL    Creatinine 1.12 0.6 - 1.3 MG/DL BUN/Creatinine ratio 12 12 - 20      GFR est AA >60 >60 ml/min/1.73m2    GFR est non-AA >60 >60 ml/min/1.73m2    Calcium 8.9 8.5 - 10.1 MG/DL    Bilirubin, total 0.6 0.2 - 1.0 MG/DL    ALT (SGPT) 22 16 - 61 U/L    AST (SGOT) 14 (L) 15 - 37 U/L    Alk. phosphatase 67 45 - 117 U/L    Protein, total 6.6 6.4 - 8.2 g/dL    Albumin 2.8 (L) 3.4 - 5.0 g/dL    Globulin 3.8 2.0 - 4.0 g/dL    A-G Ratio 0.7 (L) 0.8 - 1.7     SED RATE (ESR)    Collection Time: 04/15/19 10:32 PM   Result Value Ref Range    Sed rate, automated 80 (H) 0 - 20 mm/hr   POC LACTIC ACID    Collection Time: 04/15/19 11:25 PM   Result Value Ref Range    Lactic Acid (POC) 0.87 0.40 - 2.00 mmol/L       Radiologic Studies -   XR KNEE RT 3 V    (Results Pending)     CT Results  (Last 48 hours)    None        CXR Results  (Last 48 hours)    None            Medical Decision Making   I am the first provider for this patient. I reviewed the vital signs, available nursing notes, past medical history, past surgical history, family history and social history. Vital Signs-Reviewed the patient's vital signs. Records Reviewed: Nursing notes, old medical records and any previous labs, imaging, visits, consultations pertinent to patient care    Procedures:  Procedures    Provider Notes (Medical Decision Making):   61-year-old male here with continued pain postop total knee replacement 4 days ago. Vital signs stable, afebrile and not tachycardic. Incision site appears well, mild redness and slight swelling noted to distal tib-fib and ankle. Patient is however taking Lovenox for DVT prophylaxis. Discussed with Dr. harp he does not feel that patient needs workup for this as a cause of the pain and swelling and/or redness if he is taking this medication. WBC within normal limits, does not appear septic and x-ray appears unchanged. Discussed patient with Dr. mayes, recommends consulting orthopedist for further recommendations.   Does not feel patient needs any further workup from our emergency department. Consult: Discussed care with Dr. Suleman Riley (ortho on call for Dr. Forest Pantoja). Standard discussion; including history of patients chief complaint, available diagnostic results, and treatment course. Recommends increasing pain medication, possibly given Dilaudid in ED and having patient follow-up with Dr. Forest Pantoja first thing in the morning. Does not feel patient needs any further recommendations or admission at this time states patient just needs pain control. Patient and wife agree with plan and requesting shot of Toradol as well as Dilaudid. Patient is not a risk for falls as he is not ambulating at this time. Patient and wife will call orthopedist first thing in the morning. Will also return with worsening pain, swelling, shortness of breath or chest pain. MED RECONCILIATION:  No current facility-administered medications for this encounter. Current Outpatient Medications   Medication Sig    meloxicam (MOBIC) 15 mg tablet Take 1 Tab by mouth daily (with breakfast).  naproxen sodium (ALEVE) 220 mg tablet Take 220 mg by mouth two (2) times daily (with meals).  tamsulosin (FLOMAX) 0.4 mg capsule Take 1 Cap by mouth daily (after dinner).  ibuprofen (MOTRIN) 800 mg tablet Take  by mouth.  atorvastatin (LIPITOR) 40 mg tablet Take  by mouth daily. Disposition:  home    DISCHARGE NOTE:     Pt has been reexamined. Patient has no new complaints, changes, or physical findings. Care plan outlined and precautions discussed. Discussed proper way to take medications. Discussed treatment plan, return precautions, symptomatic relief, and expected time to improvement. All questions answered. Patient is stable for discharge and outpatient management. Patient is ready to go home.     Follow-up Information     Follow up With Specialties Details Why [address] [facility] EMERGENCY DEPT Emergency Medicine   [address] [place] [address] [address]  [address] [facility] [address]      Andres Sue MD Orthopedic Surgery Call in the am to follow up [address] [address]  [phone]            Current Discharge Medication List                Diagnosis     Clinical Impression:   1. Post-op pain    2.  Acute pain of right knee step over step

## 2024-12-28 ENCOUNTER — APPOINTMENT (OUTPATIENT)
Facility: HOSPITAL | Age: 64
End: 2024-12-28

## 2024-12-28 ENCOUNTER — HOSPITAL ENCOUNTER (EMERGENCY)
Facility: HOSPITAL | Age: 64
Discharge: HOME | End: 2024-12-28
Attending: EMERGENCY MEDICINE

## 2024-12-28 VITALS
BODY MASS INDEX: 33.34 KG/M2 | RESPIRATION RATE: 12 BRPM | SYSTOLIC BLOOD PRESSURE: 127 MMHG | WEIGHT: 220 LBS | OXYGEN SATURATION: 94 % | HEART RATE: 79 BPM | DIASTOLIC BLOOD PRESSURE: 76 MMHG | TEMPERATURE: 98.8 F | HEIGHT: 68 IN

## 2024-12-28 DIAGNOSIS — J98.01 ACUTE BRONCHOSPASM: Primary | ICD-10-CM

## 2024-12-28 DIAGNOSIS — J10.1 INFLUENZA A: ICD-10-CM

## 2024-12-28 DIAGNOSIS — R05.1 ACUTE COUGH: ICD-10-CM

## 2024-12-28 LAB
ALBUMIN SERPL-MCNC: 3.6 G/DL (ref 3.4–5)
ALBUMIN/GLOB SERPL: 1.2 (ref 0.8–1.7)
ALP SERPL-CCNC: 85 U/L (ref 45–117)
ALT SERPL-CCNC: 29 U/L (ref 16–61)
ANION GAP SERPL CALC-SCNC: 6 MMOL/L (ref 3–18)
ARTERIAL PATENCY WRIST A: POSITIVE
AST SERPL-CCNC: 17 U/L (ref 10–38)
B PERT DNA SPEC QL NAA+PROBE: NOT DETECTED
BASE DEFICIT BLD-SCNC: 0.7 MMOL/L
BASOPHILS # BLD: 0 K/UL (ref 0–0.1)
BASOPHILS NFR BLD: 1 % (ref 0–2)
BDY SITE: ABNORMAL
BILIRUB SERPL-MCNC: 0.4 MG/DL (ref 0.2–1)
BORDETELLA PARAPERTUSSIS BY PCR: NOT DETECTED
BUN SERPL-MCNC: 15 MG/DL (ref 7–18)
BUN/CREAT SERPL: 16 (ref 12–20)
C PNEUM DNA SPEC QL NAA+PROBE: NOT DETECTED
CALCIUM SERPL-MCNC: 8.4 MG/DL (ref 8.5–10.1)
CHLORIDE SERPL-SCNC: 107 MMOL/L (ref 100–111)
CO2 SERPL-SCNC: 25 MMOL/L (ref 21–32)
CREAT SERPL-MCNC: 0.93 MG/DL (ref 0.6–1.3)
DIFFERENTIAL METHOD BLD: ABNORMAL
EKG ATRIAL RATE: 71 BPM
EKG DIAGNOSIS: NORMAL
EKG P AXIS: 52 DEGREES
EKG P-R INTERVAL: 166 MS
EKG Q-T INTERVAL: 390 MS
EKG QRS DURATION: 96 MS
EKG QTC CALCULATION (BAZETT): 423 MS
EKG R AXIS: 19 DEGREES
EKG T AXIS: 29 DEGREES
EKG VENTRICULAR RATE: 71 BPM
EOSINOPHIL # BLD: 0.4 K/UL (ref 0–0.4)
EOSINOPHIL NFR BLD: 6 % (ref 0–5)
ERYTHROCYTE [DISTWIDTH] IN BLOOD BY AUTOMATED COUNT: 11.6 % (ref 11.6–14.5)
FLUAV H3 RNA SPEC QL NAA+PROBE: DETECTED
FLUBV RNA SPEC QL NAA+PROBE: NOT DETECTED
GAS FLOW.O2 O2 DELIVERY SYS: ABNORMAL
GLOBULIN SER CALC-MCNC: 3.1 G/DL (ref 2–4)
GLUCOSE SERPL-MCNC: 119 MG/DL (ref 74–99)
HADV DNA SPEC QL NAA+PROBE: NOT DETECTED
HCO3 BLD-SCNC: 23.6 MMOL/L (ref 21–28)
HCOV 229E RNA SPEC QL NAA+PROBE: NOT DETECTED
HCOV HKU1 RNA SPEC QL NAA+PROBE: NOT DETECTED
HCOV NL63 RNA SPEC QL NAA+PROBE: NOT DETECTED
HCOV OC43 RNA SPEC QL NAA+PROBE: NOT DETECTED
HCT VFR BLD AUTO: 45.2 % (ref 36–48)
HGB BLD-MCNC: 15.7 G/DL (ref 13–16)
HMPV RNA SPEC QL NAA+PROBE: NOT DETECTED
HPIV1 RNA SPEC QL NAA+PROBE: NOT DETECTED
HPIV2 RNA SPEC QL NAA+PROBE: NOT DETECTED
HPIV3 RNA SPEC QL NAA+PROBE: NOT DETECTED
HPIV4 RNA SPEC QL NAA+PROBE: NOT DETECTED
IMM GRANULOCYTES # BLD AUTO: 0 K/UL (ref 0–0.04)
IMM GRANULOCYTES NFR BLD AUTO: 0 % (ref 0–0.5)
LYMPHOCYTES # BLD: 2.3 K/UL (ref 0.9–3.6)
LYMPHOCYTES NFR BLD: 35 % (ref 21–52)
M PNEUMO DNA SPEC QL NAA+PROBE: NOT DETECTED
MAGNESIUM SERPL-MCNC: 2 MG/DL (ref 1.6–2.6)
MCH RBC QN AUTO: 32.2 PG (ref 24–34)
MCHC RBC AUTO-ENTMCNC: 34.7 G/DL (ref 31–37)
MCV RBC AUTO: 92.8 FL (ref 78–100)
MONOCYTES # BLD: 0.6 K/UL (ref 0.05–1.2)
MONOCYTES NFR BLD: 10 % (ref 3–10)
NEUTS SEG # BLD: 3.1 K/UL (ref 1.8–8)
NEUTS SEG NFR BLD: 48 % (ref 40–73)
NRBC # BLD: 0 K/UL (ref 0–0.01)
NRBC BLD-RTO: 0 PER 100 WBC
O2/TOTAL GAS SETTING VFR VENT: 21 %
PCO2 BLD: 37.3 MMHG (ref 35–48)
PH BLD: 7.41 (ref 7.35–7.45)
PLATELET # BLD AUTO: 221 K/UL (ref 135–420)
PMV BLD AUTO: 9.8 FL (ref 9.2–11.8)
PO2 BLD: 79 MMHG (ref 83–108)
POTASSIUM SERPL-SCNC: 4 MMOL/L (ref 3.5–5.5)
PROT SERPL-MCNC: 6.7 G/DL (ref 6.4–8.2)
RBC # BLD AUTO: 4.87 M/UL (ref 4.35–5.65)
RSV RNA SPEC QL NAA+PROBE: NOT DETECTED
RV+EV RNA SPEC QL NAA+PROBE: NOT DETECTED
SAO2 % BLD: 95.7 % (ref 92–97)
SARS-COV-2 RNA RESP QL NAA+PROBE: NOT DETECTED
SERVICE CMNT-IMP: ABNORMAL
SODIUM SERPL-SCNC: 138 MMOL/L (ref 136–145)
SPECIMEN TYPE: ABNORMAL
WBC # BLD AUTO: 6.4 K/UL (ref 4.6–13.2)

## 2024-12-28 PROCEDURE — 0202U NFCT DS 22 TRGT SARS-COV-2: CPT

## 2024-12-28 PROCEDURE — 36600 WITHDRAWAL OF ARTERIAL BLOOD: CPT

## 2024-12-28 PROCEDURE — 83735 ASSAY OF MAGNESIUM: CPT

## 2024-12-28 PROCEDURE — 80053 COMPREHEN METABOLIC PANEL: CPT

## 2024-12-28 PROCEDURE — 94640 AIRWAY INHALATION TREATMENT: CPT

## 2024-12-28 PROCEDURE — 82803 BLOOD GASES ANY COMBINATION: CPT

## 2024-12-28 PROCEDURE — 96365 THER/PROPH/DIAG IV INF INIT: CPT

## 2024-12-28 PROCEDURE — 71045 X-RAY EXAM CHEST 1 VIEW: CPT

## 2024-12-28 PROCEDURE — 6360000002 HC RX W HCPCS: Performed by: EMERGENCY MEDICINE

## 2024-12-28 PROCEDURE — 2500000003 HC RX 250 WO HCPCS: Performed by: EMERGENCY MEDICINE

## 2024-12-28 PROCEDURE — 6370000000 HC RX 637 (ALT 250 FOR IP): Performed by: EMERGENCY MEDICINE

## 2024-12-28 PROCEDURE — 99285 EMERGENCY DEPT VISIT HI MDM: CPT

## 2024-12-28 PROCEDURE — 93010 ELECTROCARDIOGRAM REPORT: CPT | Performed by: INTERNAL MEDICINE

## 2024-12-28 PROCEDURE — 93005 ELECTROCARDIOGRAM TRACING: CPT | Performed by: EMERGENCY MEDICINE

## 2024-12-28 PROCEDURE — 96375 TX/PRO/DX INJ NEW DRUG ADDON: CPT

## 2024-12-28 PROCEDURE — 85025 COMPLETE CBC W/AUTO DIFF WBC: CPT

## 2024-12-28 RX ORDER — MAGNESIUM SULFATE IN WATER 40 MG/ML
2000 INJECTION, SOLUTION INTRAVENOUS
Status: COMPLETED | OUTPATIENT
Start: 2024-12-28 | End: 2024-12-28

## 2024-12-28 RX ORDER — IPRATROPIUM BROMIDE AND ALBUTEROL SULFATE 2.5; .5 MG/3ML; MG/3ML
1 SOLUTION RESPIRATORY (INHALATION) ONCE
Status: COMPLETED | OUTPATIENT
Start: 2024-12-28 | End: 2024-12-28

## 2024-12-28 RX ORDER — ALBUTEROL SULFATE 0.83 MG/ML
2.5 SOLUTION RESPIRATORY (INHALATION)
Status: COMPLETED | OUTPATIENT
Start: 2024-12-28 | End: 2024-12-28

## 2024-12-28 RX ORDER — BENZONATATE 100 MG/1
100 CAPSULE ORAL 3 TIMES DAILY PRN
Qty: 21 CAPSULE | Refills: 0 | Status: SHIPPED | OUTPATIENT
Start: 2024-12-28 | End: 2025-01-04

## 2024-12-28 RX ORDER — ALBUTEROL SULFATE 90 UG/1
2 INHALANT RESPIRATORY (INHALATION) 4 TIMES DAILY PRN
Qty: 54 G | Refills: 1 | Status: SHIPPED | OUTPATIENT
Start: 2024-12-28

## 2024-12-28 RX ORDER — CODEINE PHOSPHATE AND GUAIFENESIN 10; 100 MG/5ML; MG/5ML
5 SOLUTION ORAL 3 TIMES DAILY PRN
Qty: 150 ML | Refills: 0 | Status: SHIPPED | OUTPATIENT
Start: 2024-12-28 | End: 2025-01-07

## 2024-12-28 RX ORDER — PREDNISONE 50 MG/1
50 TABLET ORAL DAILY
Qty: 5 TABLET | Refills: 0 | Status: SHIPPED | OUTPATIENT
Start: 2024-12-28 | End: 2025-01-02

## 2024-12-28 RX ADMIN — WATER 125 MG: 1 INJECTION INTRAMUSCULAR; INTRAVENOUS; SUBCUTANEOUS at 00:47

## 2024-12-28 RX ADMIN — IPRATROPIUM BROMIDE AND ALBUTEROL SULFATE 1 DOSE: .5; 3 SOLUTION RESPIRATORY (INHALATION) at 01:25

## 2024-12-28 RX ADMIN — MAGNESIUM SULFATE HEPTAHYDRATE 2000 MG: 40 INJECTION, SOLUTION INTRAVENOUS at 00:46

## 2024-12-28 RX ADMIN — ALBUTEROL SULFATE 2.5 MG: 2.5 SOLUTION RESPIRATORY (INHALATION) at 01:05

## 2024-12-28 ASSESSMENT — LIFESTYLE VARIABLES
HOW OFTEN DO YOU HAVE A DRINK CONTAINING ALCOHOL: NEVER
HOW MANY STANDARD DRINKS CONTAINING ALCOHOL DO YOU HAVE ON A TYPICAL DAY: PATIENT DOES NOT DRINK

## 2024-12-28 ASSESSMENT — PAIN - FUNCTIONAL ASSESSMENT: PAIN_FUNCTIONAL_ASSESSMENT: NONE - DENIES PAIN

## 2024-12-28 NOTE — DISCHARGE INSTRUCTIONS
Use 2 puffs of the albuterol inhaler as needed for wheezing, chest tightness, shortness of breath.I have also prescribed 2 cough suppressants.

## 2024-12-28 NOTE — ED TRIAGE NOTES
Patient presents to the ED via EMS from home after waking up around midnight having difficulty breathing. Wife called 911. Per wife, they both had upper respiratory issues this past Monday but then felt on the mend until tonight.    On arrival, audiable wheezing noted without stethoscope. Patient is barely able to talk. 98% on room air

## 2024-12-28 NOTE — ED PROVIDER NOTES
mouth      predniSONE (DELTASONE) 5 MG tablet ceived the following from [facility]: Outside name: predniSONE (STERAPRED) 5 mg dose pack      rosuvastatin (CRESTOR) 20 MG tablet ceived the following from [facility]: Outside name: rosuvastatin (CRESTOR) 20 mg tablet      sertraline (ZOLOFT) 50 MG tablet ceived the following from [facility]: Outside name: sertraline (ZOLOFT) 50 mg tablet      sildenafil (VIAGRA) 100 MG tablet Take 1 tab my mouth as needed 1 hour prior to sexual activity on an empty stomach.  Indications: the inability to have an erection      tadalafil (CIALIS) 5 MG tablet TAKE ONE TABLET BY MOUTH DAILY         Past History     Past Medical History:  Past Medical History:   Diagnosis Date    BPH (benign prostatic hypertrophy)     GERD (gastroesophageal reflux disease)     Herniated nucleus pulposus     High cholesterol     Inflammatory bowel disease     Malignant neoplasm of prostate (HCC)        Past Surgical History:  Past Surgical History:   Procedure Laterality Date    COLONOSCOPY      HERNIA REPAIR      ORTHOPEDIC SURGERY      PROSTATE SURGERY  2021    TOTAL KNEE ARTHROPLASTY Right 06/2019    UROLOGICAL SURGERY  01/17/2020    Prostatic Volume: 20 grams .  Douglas 6 (3+3) R [place] A, L Lat Mid F,  R Lat [place] D.  PSA 4.4.  Dr Hernandez.    UROLOGICAL SURGERY  01/17/2020    PNBx- Trus vol= 20 grams- [place] 3+3=6 (3 of 12 cores)    VASECTOMY         Family History:  Family History   Problem Relation Age of Onset    Cancer Mother     Stroke Mother     Hypertension Father        Social History:  Social History     Tobacco Use    Smoking status: Never    Smokeless tobacco: Never   Substance Use Topics    Alcohol use: Not Currently     Alcohol/week: 5.0 standard drinks of alcohol    Drug use: No       Allergies:  No Known Allergies      Physical Exam     General: Patient is awake and alert, resting comfortably in no acute distress.  ENT: No tonsillar  candidate for outpatient follow up. They understand that they should return to the Emergency Department for any new or worsening symptoms. I stressed the importance of follow up for repeat assessment and possibly further evaluation/treatment.        Meds Given in ED:  Medications   methylPREDNISolone sodium succ (SOLU-MEDROL) 125 mg in sterile water 2 mL injection (125 mg IntraVENous Given 12/28/24 0047)   magnesium sulfate 2000 mg in 50 mL IVPB premix (0 mg IntraVENous Stopped 12/28/24 0147)   ipratropium 0.5 mg-albuterol 2.5 mg (DUONEB) nebulizer solution 1 Dose (1 Dose Inhalation Given 12/28/24 0125)   albuterol (PROVENTIL) (2.5 MG/3ML) 0.083% nebulizer solution 2.5 mg (2.5 mg Nebulization Given 12/28/24 0105)       Final Diagnosis:  1. Acute bronchospasm    2. Acute cough    3. Influenza A        Disposition:  Destination: Discharge    Discharge Rx:   New Prescriptions    ALBUTEROL SULFATE HFA (VENTOLIN HFA) 108 (90 BASE) MCG/ACT INHALER    Inhale 2 puffs into the lungs 4 times daily as needed for Wheezing    BENZONATATE (TESSALON PERLES) 100 MG CAPSULE    Take 1 capsule by mouth 3 times daily as needed for Cough    GUAIFENESIN-CODEINE (GUAIFENESIN AC) 100-10 MG/5ML LIQUID    Take 5 mLs by mouth 3 times daily as needed for Cough for up to 10 days. Max Daily Amount: 15 mLs    PREDNISONE (DELTASONE) 50 MG TABLET    Take 1 tablet by mouth daily for 5 days         Dictation disclaimer: Please note that this dictation was completed with Sw[employer], the [employer] voice recognition software. Quite often unanticipated grammatical, syntax, homophones, and other interpretive errors are inadvertently transcribed by the computer software. Please disregard these errors. Please excuse any errors that have escaped final proofreading.     Kennedy Brooks D.O.  Emergency Physician   [facility]             Kennedy Brooks,   12/28/24 4291

## 2025-03-10 ENCOUNTER — TRANSCRIBE ORDERS (OUTPATIENT)
Facility: HOSPITAL | Age: 65
End: 2025-03-10

## 2025-03-10 DIAGNOSIS — R91.1 NODULE OF RIGHT LUNG: Primary | ICD-10-CM

## 2025-03-17 ENCOUNTER — HOSPITAL ENCOUNTER (OUTPATIENT)
Facility: HOSPITAL | Age: 65
Discharge: HOME | End: 2025-03-20
Payer: COMMERCIAL

## 2025-03-17 DIAGNOSIS — R91.1 NODULE OF RIGHT LUNG: ICD-10-CM

## 2025-03-17 PROCEDURE — 71250 CT THORAX DX C-: CPT

## (undated) DEVICE — BLANKET WRM AD W50XL85.8IN PACU FULL BODY FORC AIR

## (undated) DEVICE — SOFT SILICONE HYDROCELLULAR SACRUM DRESSING WITH LOCK AWAY LAYER: Brand: ALLEVYN LIFE SACRUM (LARGE) PACK OF 10

## (undated) DEVICE — REM POLYHESIVE ADULT PATIENT RETURN ELECTRODE: Brand: VALLEYLAB

## (undated) DEVICE — TRAY CATH 1000ML COLL BG RED RUB BI LEV INTMIT DRP UNDPD

## (undated) DEVICE — KIT CLN UP BON SECOURS MARYV

## (undated) DEVICE — ARM DRAPE

## (undated) DEVICE — DRAPE TOWEL: Brand: CONVERTORS

## (undated) DEVICE — TIP COVER ACCESSORY

## (undated) DEVICE — NEEDLE HYPO 25GA L1.5IN BVL ORIENTED ECLIPSE

## (undated) DEVICE — COVER,LIGHT HANDLE,FLX,1/PK: Brand: MEDLINE INDUSTRIES, INC.

## (undated) DEVICE — STERILE POLYISOPRENE POWDER-FREE SURGICAL GLOVES: Brand: PROTEXIS

## (undated) DEVICE — COLUMN DRAPE

## (undated) DEVICE — GLOVE SURG SZ 8 L11.77IN FNGR THK9.8MIL STRW LTX POLYMER

## (undated) DEVICE — Device

## (undated) DEVICE — BINDER ABD M/L H12IN FOR 46-62IN WHT 4 SLD PNL DSGN HOOP

## (undated) DEVICE — GARMENT,MEDLINE,DVT,SEQUENTIAL,CALF,MD: Brand: MEDLINE

## (undated) DEVICE — GLOVE SURG BIOGEL 8.0 STRL -- SKINSENSE

## (undated) DEVICE — BLADELESS OBTURATOR: Brand: WECK VISTA

## (undated) DEVICE — SUTURE V-LOC 180 SZ 0 L9IN ABSRB GRN GS-21 L37MM 1/2 CIR VLOCL0346

## (undated) DEVICE — (D)PREP SKN CHLRAPRP APPL 26ML -- CONVERT TO ITEM 371833

## (undated) DEVICE — INTENDED FOR TISSUE SEPARATION, AND OTHER PROCEDURES THAT REQUIRE A SHARP SURGICAL BLADE TO PUNCTURE OR CUT.: Brand: BARD-PARKER ®  SAFETY SCALPED

## (undated) DEVICE — SOLUTION IV 1000ML 0.9% SOD CHL

## (undated) DEVICE — MAYO STAND COVER: Brand: CONVERTORS

## (undated) DEVICE — SUTURE MCRYL SZ 4-0 L27IN ABSRB UD L24MM PS-1 3/8 CIR PRIM Y935H

## (undated) DEVICE — 3M™ IOBAN™ 2 ANTIMICROBIAL INCISE DRAPE 6651EZ: Brand: IOBAN™ 2

## (undated) DEVICE — INSTRMT SET WND CLSR SUT PASS --

## (undated) DEVICE — SUTURE VCRL SZ 0 L18IN ABSRB UD POLYGLACTIN 910 BRAID TIE J912G

## (undated) DEVICE — TROCAR: Brand: KII® OPTICAL ACCESS SYSTEM

## (undated) DEVICE — SYR 10ML LUER LOK 1/5ML GRAD --

## (undated) DEVICE — SEAL UNIV 5-8MM DISP BX/10 -- DA VINCI XI - SNGL USE